# Patient Record
Sex: FEMALE | Race: WHITE | NOT HISPANIC OR LATINO | Employment: PART TIME | URBAN - METROPOLITAN AREA
[De-identification: names, ages, dates, MRNs, and addresses within clinical notes are randomized per-mention and may not be internally consistent; named-entity substitution may affect disease eponyms.]

---

## 2017-01-03 RX ORDER — HYDROCHLOROTHIAZIDE 12.5 MG/1
CAPSULE, GELATIN COATED ORAL
Qty: 90 CAP | Refills: 1 | Status: SHIPPED | OUTPATIENT
Start: 2017-01-03 | End: 2017-06-22 | Stop reason: SDUPTHER

## 2017-01-04 ENCOUNTER — ANTICOAGULATION MONITORING (OUTPATIENT)
Dept: VASCULAR LAB | Facility: MEDICAL CENTER | Age: 77
End: 2017-01-04
Attending: NURSE PRACTITIONER
Payer: COMMERCIAL

## 2017-01-04 DIAGNOSIS — I48.19 PERSISTENT ATRIAL FIBRILLATION (HCC): ICD-10-CM

## 2017-01-04 LAB
INR BLD: 4.1 (ref 0.9–1.2)
INR PPP: 4.1 (ref 2–3.5)

## 2017-01-04 PROCEDURE — 85610 PROTHROMBIN TIME: CPT

## 2017-01-04 PROCEDURE — 99212 OFFICE O/P EST SF 10 MIN: CPT

## 2017-01-04 NOTE — MR AVS SNAPSHOT
Rebekah Hernandezbins   2017 7:30 AM   Anticoagulation Monitoring   MRN: [id number]    Department:  Vascular Medicine   Dept Phone:  [phone]    Description:  Female : 1940   Provider:  [facility] EXAM 5           Allergies as of 2017     Allergen Noted Reactions    Percocet [Oxycodone-Acetaminophen] 2014         You were diagnosed with     Persistent atrial fibrillation (HCC)   [595338]         Vital Signs     Smoking Status                   Never Smoker            Basic Information     Date Of Birth Sex Race Ethnicity Preferred Language    1940 Female White Non- English      Your appointments     2017  7:30 AM   Established Patient with [facility] EXAM 4   [facility] [place] [facility]  (--)    [address]  [place] [address]   [phone]            2017  1:00 PM   PACER CHECK ONLY with PACER CHECK-CAM B   [facility] for Heart and Vascular Health-CAM B (--)    [address], [address]  [place] [address] [phone]            2017  1:30 PM   FOLLOW UP with Dayo Nelson M.D.   [facility] (--)    [address], [address]  [place] [address] [phone]              Problem List              ICD-10-CM Priority Class Noted - Resolved    Hypercholesterolemia E78.00 High  Unknown - Present    Essential hypertension, benign I10 High  Unknown - Present    Hypertrophic obstructive cardiomyopathy (HCC) I42.1 High  Unknown - Present    History of orthostatic hypotension Z86.79 High  Unknown - Present    Palpitations R00.2 High  Unknown - Present    Sinus bradycardia R00.1 High  Unknown - Present    History of digestive disorder Z87.19 High  Unknown - Present    HH (hiatus hernia) K44.9 High  Unknown - Present    Cardiac pacemaker in situ Z95.0 High  2012 - Present    Other and unspecified hyperlipidemia E78.5   2014 - Present    Persistent atrial fibrillation  ([facility]) I48.1 High  1/21/2015 - Present    Hyperlipemia (low HDL high LDL) E78.5   1/20/2016 - Present      Health Maintenance        Date Due Completion Dates    IMM DTaP/Tdap/Td Vaccine (1 - Tdap) 9/12/1961 ---    PAP SMEAR 9/12/1963 ---    MAMMOGRAM 9/12/1982 ---    COLONOSCOPY 9/12/1992 ---    IMM ZOSTER VACCINE 9/12/2002 ---    BONE DENSITY 9/12/2007 ---    IMM PNEUMOCOCCAL 65+ (ADULT) LOW/MEDIUM RISK SERIES (1 of 2 - PCV13) 9/12/2007 ---    IMM INFLUENZA (1) 9/1/2016 ---            Results     POCT Protime      Component    INR    4.1                        Current Immunizations     No immunizations on file.      Below and/or attached are the medications your provider expects you to take. Review all of your home medications and newly ordered medications with your provider and/or pharmacist. Follow medication instructions as directed by your provider and/or pharmacist. Please keep your medication list with you and share with your provider. Update the information when medications are discontinued, doses are changed, or new medications (including over-the-counter products) are added; and carry medication information at all times in the event of emergency situations     Allergies:  PERCOCET - (reactions not documented)               Medications  Valid as of: January 04, 2017 -  7:31 AM    Generic Name Brand Name Tablet Size Instructions for use    AmLODIPine Besylate (Tab) NORVASC 5 MG TAKE 1 TABLET BY MOUTH EVERY DAY        Calcium-Magnesium-Vitamin D   Take 500 mg by mouth every day.        Carvedilol (Tab) COREG 25 MG TAKE 1 TABLET BY MOUTH TWICE A DAY WITH MEALS        Cholecalciferol (Tab) cholecalciferol 1000 UNIT Take 2,000 Units by mouth every day.        DiltiaZEM HCl Coated Beads (CAPSULE SR 24 HR) CARDIZEM  MG Take 1 Cap by mouth every day.        HydroCHLOROthiazide (Cap) MICROZIDE 12.5 MG TAKE 1 CAPSULE BY MOUTH EVERY DAY        Multiple Minerals-Vitamins (Tab) CITRACAL PLUS  Take 1 Tab by mouth  every day.        Simvastatin (Tab) ZOCOR 20 MG Take 20 mg by mouth every evening.        Triamcinolone Acetonide (Cream) KENALOG 0.1 % Apply  to affected area(s) every day.        Warfarin Sodium (Tab) COUMADIN 5 MG Take 1 Tab by mouth every day.        .                 Medicines prescribed today were sent to:     [id number] - [place], [place] - [id number] [facility] [place] [address]    Phone: [phone] Fax: [phone]    Open 24 Hours?: No      Medication refill instructions:       If your prescription bottle indicates you have medication refills left, it is not necessary to call your provider’s office. Please contact your pharmacy and they will refill your medication.    If your prescription bottle indicates you do not have any refills left, you may request refills at any time through one of the following ways: The online Think Good Thoughts system (except Urgent Care), by calling your provider’s office, or by asking your pharmacy to contact your provider’s office with a refill request. Medication refills are processed only during regular business hours and may not be available until the next business day. Your provider may request additional information or to have a follow-up visit with you prior to refilling your medication.   *Please Note: Medication refills are assigned a new Rx number when refilled electronically. Your pharmacy may indicate that no refills were authorized even though a new prescription for the same medication is available at the pharmacy. Please request the medicine by name with the pharmacy before contacting your provider for a refill.        Warfarin Dosing Calendar   January 2017 Details    Sun Mon Tue Wed Thu Fri Sat     1               2               3               4   4.1   Hold   See details      5      5 mg         6      5 mg         7      5 mg           8      5 mg         9      5 mg         10      5 mg         11      5 mg         12      5 mg          13      5 mg         14      5 mg           15      5 mg         16      5 mg         17      5 mg         18      5 mg         19               20               21                 22               23               24               25               26               27               28                 29               30               31                    Date Details   01/04 This INR check   INR: 4.1       Date of next INR:  1/18/2017         How to take your warfarin dose     To take:  5 mg Take 1 of the 5 mg tablets.    Hold Do not take your warfarin dose. See the Details table to the right for additional instructions.                   Celotor Access Code: [email or web address]  Expires: 2/3/2017  7:14 AM    Celotor  A secure, online tool to manage your health information     [employer]’s Celotor® is a secure, online tool that connects you to your personalized health information from the privacy of your home -- day or night - making it very easy for you to manage your healthcare. Once the activation process is completed, you can even access your medical information using the Celotor mala, which is available for free in the Apple Mala store or Google Play store.     Celotor provides the following levels of access (as shown below):   My Chart Features   [facility] Primary [facility] Doctor [facility]  Specialists [facility]  Urgent  Care Non-[facility]  Primary Care  Doctor   Email your healthcare team securely and privately 24/7 X X X    Manage appointments: schedule your next appointment; view details of past/upcoming appointments X      Request prescription refills. X      View recent personal medical records, including lab and immunizations X X X X   View health record, including health history, allergies, medications X X X X   Read reports about your outpatient visits, procedures, consult and ER notes X X X X   See your discharge summary, which is a recap of your hospital and/or ER visit that includes your diagnosis, lab results, and  care plan. X X       How to register for IPX:  1. Go to  [email or web address].  2. Click on the Sign Up Now box, which takes you to the New Member Sign Up page. You will need to provide the following information:  a. Enter your IPX Access Code exactly as it appears at the top of this page. (You will not need to use this code after you’ve completed the sign-up process. If you do not sign up before the expiration date, you must request a new code.)   b. Enter your date of birth.   c. Enter your home email address.   d. Click Submit, and follow the next screen’s instructions.  3. Create a IPX ID. This will be your IPX login ID and cannot be changed, so think of one that is secure and easy to remember.  4. Create a Cookistot password. You can change your password at any time.  5. Enter your Password Reset Question and Answer. This can be used at a later time if you forget your password.   6. Enter your e-mail address. This allows you to receive e-mail notifications when new information is available in IPX.  7. Click Sign Up. You can now view your health information.    For assistance activating your IPX account, call [phone]

## 2017-01-04 NOTE — PROGRESS NOTES
Anticoagulation Summary as of 1/4/2017     INR goal 2.0-3.0   Selected INR 4.1! (1/4/2017)   Maintenance plan 5 mg (5 mg x 1) every day   Weekly total 35 mg   Plan last modified LEXIS Hazel (2/22/2016)   Next INR check 1/18/2017   Target end date Indefinite    Indications   Persistent atrial fibrillation (HCC) [I48.1]         Anticoagulation Episode Summary     INR check location     Preferred lab     Send INR reminders to     Comments       Anticoagulation Care Providers     Provider Role Specialty Phone number    Dayo Nelson M.D. Referring Cardiology [phone]    [facility] Anticoagulation Services Responsible  [phone]        Anticoagulation Patient Findings    Patient's INR was SUPRA therapeutic today in clinic.     Pt denies any unusual s/s of bleeding, bruising, clotting or any changes to diet or medications.  Denies alcohol or cranberry use.   Confirmed dosing regimen.  Pt to hold today then Pt is to continue with current warfarin dosing regimen.    Follow up in 2 weeks then extend interval back to 8 weeks if therapeutic next visit.    Matt Walters, PHARMD

## 2017-01-06 DIAGNOSIS — I48.19 PERSISTENT ATRIAL FIBRILLATION (HCC): ICD-10-CM

## 2017-01-18 ENCOUNTER — ANTICOAGULATION VISIT (OUTPATIENT)
Dept: VASCULAR LAB | Facility: MEDICAL CENTER | Age: 77
End: 2017-01-18
Attending: NURSE PRACTITIONER
Payer: COMMERCIAL

## 2017-01-18 DIAGNOSIS — I48.19 PERSISTENT ATRIAL FIBRILLATION (HCC): ICD-10-CM

## 2017-01-18 LAB
INR BLD: 3.5 (ref 0.9–1.2)
INR PPP: 3.5 (ref 2–3.5)

## 2017-01-18 PROCEDURE — 99212 OFFICE O/P EST SF 10 MIN: CPT

## 2017-01-18 PROCEDURE — 85610 PROTHROMBIN TIME: CPT

## 2017-01-18 NOTE — PROGRESS NOTES
OP Anticoagulation Service Note    Date: 1/18/2017    Anticoagulation Summary as of 1/18/2017     INR goal 2.0-3.0   Selected INR 3.5! (1/18/2017)   Maintenance plan 2.5 mg (5 mg x 0.5) on Wed; 5 mg (5 mg x 1) all other days   Weekly total 32.5 mg   Plan last modified Luisa Nick PHARMD (1/18/2017)   Next INR check 2/1/2017   Target end date Indefinite    Indications   Persistent atrial fibrillation (CMS-HCC) [I48.1]         Anticoagulation Episode Summary     INR check location     Preferred lab     Send INR reminders to     Comments       Anticoagulation Care Providers     Provider Role Specialty Phone number    Dayo Nelson M.D. Referring Cardiology [phone]    [facility] Anticoagulation Services Responsible  [phone]        Anticoagulation Patient Findings   Positives Medication Changes    Negatives Missed Doses, Extra Doses, Antibiotic Use, Diet Changes, Dental/Other Procedures, Hospitalization, Bleeding Gums, Nose Bleeds, Blood in Urine, Blood in Stool, Any Bruising, Other Complaints          Plan:  INR is high today. Confirmed dosing regimen. No missed or extra doses taken. Patient denies sign/symptoms of bleeding/clotting. Pt was started on amoxicillin 875 mg BID x 10 days. Patient has been eating a consistent diet. Instructed pt to call clinic with any concerns of bleeding or thrombosis. Will decrease weekly warfarin dosing regimen.  Follow up in 2 weeks      Luisa Nick, Pharm D

## 2017-01-18 NOTE — MR AVS SNAPSHOT
Rebekah Hayward   2017 7:30 AM   Anticoagulation Visit   MRN: [id number]    Department:  Vascular Medicine   Dept Phone:  [phone]    Description:  Female : 1940   Provider:  [facility] EXAM 4           Allergies as of 2017     Allergen Noted Reactions    Percocet [Oxycodone-Acetaminophen] 2014         You were diagnosed with     Persistent atrial fibrillation (CMS-HCC)   [616310]         Vital Signs     Smoking Status                   Never Smoker            Basic Information     Date Of Birth Sex Race Ethnicity Preferred Language    1940 Female White Non- English      Your appointments     2017  1:00 PM   PACER CHECK ONLY with PACER CHECK-CAM B   [facility] for [facility] and Vascular Health-CAM B (--)    [address], [address]  [place] [address] [phone]            2017  1:30 PM   FOLLOW UP with Dayo Nelson M.D.   [facility] and Vascular Health-CAM B (--)    [address], [address]  [place] [address] [phone]            2017  7:30 AM   Established Patient with [facility] EXAM 4   [facility] V[facility]  (--)    [address]  [place] [address]   [phone]              Problem List              ICD-10-CM Priority Class Noted - Resolved    Hypercholesterolemia E78.00 High  Unknown - Present    Essential hypertension, benign I10 High  Unknown - Present    Hypertrophic obstructive cardiomyopathy (CMS-HCC) I42.1 High  Unknown - Present    History of orthostatic hypotension Z86.79 High  Unknown - Present    Palpitations R00.2 High  Unknown - Present    Sinus bradycardia R00.1 High  Unknown - Present    History of digestive disorder Z87.19 High  Unknown - Present    HH (hiatus hernia) K44.9 High  Unknown - Present    Cardiac pacemaker in situ Z95.0 High  2012 - Present    Other and unspecified hyperlipidemia E78.5   2014 - Present    Persistent atrial  fibrillation (CMS-HCC) I48.1 High  1/21/2015 - Present    Hyperlipemia (low HDL high LDL) E78.5   1/20/2016 - Present      Health Maintenance        Date Due Completion Dates    IMM DTaP/Tdap/Td Vaccine (1 - Tdap) 9/12/1961 ---    PAP SMEAR 9/12/1963 ---    MAMMOGRAM 9/12/1982 ---    COLONOSCOPY 9/12/1992 ---    IMM ZOSTER VACCINE 9/12/2002 ---    BONE DENSITY 9/12/2007 ---    IMM PNEUMOCOCCAL 65+ (ADULT) LOW/MEDIUM RISK SERIES (1 of 2 - PCV13) 9/12/2007 ---    IMM INFLUENZA (1) 9/1/2016 ---            Results     POCT Protime      Component    INR    3.5                        Current Immunizations     No immunizations on file.      Below and/or attached are the medications your provider expects you to take. Review all of your home medications and newly ordered medications with your provider and/or pharmacist. Follow medication instructions as directed by your provider and/or pharmacist. Please keep your medication list with you and share with your provider. Update the information when medications are discontinued, doses are changed, or new medications (including over-the-counter products) are added; and carry medication information at all times in the event of emergency situations     Allergies:  PERCOCET - (reactions not documented)               Medications  Valid as of: January 18, 2017 -  7:37 AM    Generic Name Brand Name Tablet Size Instructions for use    AmLODIPine Besylate (Tab) NORVASC 5 MG TAKE 1 TABLET BY MOUTH EVERY DAY        Calcium-Magnesium-Vitamin D   Take 500 mg by mouth every day.        Carvedilol (Tab) COREG 25 MG TAKE 1 TABLET BY MOUTH TWICE A DAY WITH MEALS        Cholecalciferol (Tab) cholecalciferol 1000 UNIT Take 2,000 Units by mouth every day.        DiltiaZEM HCl Coated Beads (CAPSULE SR 24 HR) CARDIZEM  MG Take 1 Cap by mouth every day.        HydroCHLOROthiazide (Cap) MICROZIDE 12.5 MG TAKE 1 CAPSULE BY MOUTH EVERY DAY        Multiple Minerals-Vitamins (Tab) CITRACAL PLUS  Take  1 Tab by mouth every day.        Simvastatin (Tab) ZOCOR 20 MG Take 20 mg by mouth every evening.        Triamcinolone Acetonide (Cream) KENALOG 0.1 % Apply  to affected area(s) every day.        Warfarin Sodium (Tab) COUMADIN 5 MG Take 1 Tab by mouth every day.        .                 Medicines prescribed today were sent to:     [facility] - [place], [place] - [id number] [facility] [place] [address]    Phone: [phone] Fax: [phone]    Open 24 Hours?: No      Medication refill instructions:       If your prescription bottle indicates you have medication refills left, it is not necessary to call your provider’s office. Please contact your pharmacy and they will refill your medication.    If your prescription bottle indicates you do not have any refills left, you may request refills at any time through one of the following ways: The online First Meta system (except Urgent Care), by calling your provider’s office, or by asking your pharmacy to contact your provider’s office with a refill request. Medication refills are processed only during regular business hours and may not be available until the next business day. Your provider may request additional information or to have a follow-up visit with you prior to refilling your medication.   *Please Note: Medication refills are assigned a new Rx number when refilled electronically. Your pharmacy may indicate that no refills were authorized even though a new prescription for the same medication is available at the pharmacy. Please request the medicine by name with the pharmacy before contacting your provider for a refill.        Warfarin Dosing Calendar   January 2017 Details    Sun Mon Tue Wed Thu Fri Sat     1               2               3               4               5               6               7                 8               9               10               11               12               13               14                  15               16               17               18   3.5   2.5 mg   See details      19      5 mg         20      5 mg         21      5 mg           22      5 mg         23      5 mg         24      5 mg         25      2.5 mg         26      5 mg         27      5 mg         28      5 mg           29      5 mg         30      5 mg         31      5 mg              Date Details   01/18 This INR check   INR: 3.5               How to take your warfarin dose     To take:  2.5 mg Take 0.5 of a 5 mg tablet.    To take:  5 mg Take 1 of the 5 mg tablets.           Warfarin Dosing Calendar   February 2017 Details    Sun Mon Tue Wed Thu Fri Sat        1      2.5 mg         2               3               4                 5               6               7               8               9               10               11                 12               13               14               15               16               17               18                 19               20               21               22               23               24               25                 26               27               28                    Date Details   No additional details    Date of next INR:  2/1/2017         How to take your warfarin dose     To take:  2.5 mg Take 0.5 of a 5 mg tablet.              ESP Technologies Access Code: [email or web address]  Expires: 2/3/2017  7:14 AM    ESP Technologies  A secure, online tool to manage your health information     [employer]’s ESP Technologies® is a secure, online tool that connects you to your personalized health information from the privacy of your home -- day or night - making it very easy for you to manage your healthcare. Once the activation process is completed, you can even access your medical information using the ESP Technologies mala, which is available for free in the Apple Mala store or Google Play store.     ESP Technologies provides the following levels of access (as shown below):   My Chart Features   [facility]  Care Doctor [facility]  Specialists [facility]  Urgent  Care Non-[facility]  Primary Care  Doctor   Email your healthcare team securely and privately 24/7 X X X    Manage appointments: schedule your next appointment; view details of past/upcoming appointments X      Request prescription refills. X      View recent personal medical records, including lab and immunizations X X X X   View health record, including health history, allergies, medications X X X X   Read reports about your outpatient visits, procedures, consult and ER notes X X X X   See your discharge summary, which is a recap of your hospital and/or ER visit that includes your diagnosis, lab results, and care plan. X X       How to register for [employer]:  1. Go to  [email or web address].  2. Click on the Sign Up Now box, which takes you to the New Member Sign Up page. You will need to provide the following information:  a. Enter your NephroGenex Access Code exactly as it appears at the top of this page. (You will not need to use this code after you’ve completed the sign-up process. If you do not sign up before the expiration date, you must request a new code.)   b. Enter your date of birth.   c. Enter your home email address.   d. Click Submit, and follow the next screen’s instructions.  3. Create a NephroGenex ID. This will be your NephroGenex login ID and cannot be changed, so think of one that is secure and easy to remember.  4. Create a NephroGenex password. You can change your password at any time.  5. Enter your Password Reset Question and Answer. This can be used at a later time if you forget your password.   6. Enter your e-mail address. This allows you to receive e-mail notifications when new information is available in NephroGenex.  7. Click Sign Up. You can now view your health information.    For assistance activating your NephroGenex account, call [phone]

## 2017-01-31 ENCOUNTER — NON-PROVIDER VISIT (OUTPATIENT)
Dept: CARDIOLOGY | Facility: MEDICAL CENTER | Age: 77
End: 2017-01-31
Payer: COMMERCIAL

## 2017-01-31 ENCOUNTER — OFFICE VISIT (OUTPATIENT)
Dept: CARDIOLOGY | Facility: MEDICAL CENTER | Age: 77
End: 2017-01-31
Payer: COMMERCIAL

## 2017-01-31 VITALS
WEIGHT: 169 LBS | SYSTOLIC BLOOD PRESSURE: 124 MMHG | HEIGHT: 68 IN | HEART RATE: 72 BPM | BODY MASS INDEX: 25.61 KG/M2 | DIASTOLIC BLOOD PRESSURE: 82 MMHG

## 2017-01-31 DIAGNOSIS — Z95.0 CARDIAC PACEMAKER IN SITU: ICD-10-CM

## 2017-01-31 DIAGNOSIS — I10 ESSENTIAL HYPERTENSION, BENIGN: ICD-10-CM

## 2017-01-31 DIAGNOSIS — I48.19 PERSISTENT ATRIAL FIBRILLATION (HCC): ICD-10-CM

## 2017-01-31 PROCEDURE — 1101F PT FALLS ASSESS-DOCD LE1/YR: CPT | Mod: 8P | Performed by: INTERNAL MEDICINE

## 2017-01-31 PROCEDURE — G8484 FLU IMMUNIZE NO ADMIN: HCPCS | Performed by: INTERNAL MEDICINE

## 2017-01-31 PROCEDURE — 3014F SCREEN MAMMO DOC REV: CPT | Mod: 8P | Performed by: INTERNAL MEDICINE

## 2017-01-31 PROCEDURE — 3017F COLORECTAL CA SCREEN DOC REV: CPT | Mod: 8P | Performed by: INTERNAL MEDICINE

## 2017-01-31 PROCEDURE — G8432 DEP SCR NOT DOC, RNG: HCPCS | Performed by: INTERNAL MEDICINE

## 2017-01-31 PROCEDURE — G8420 CALC BMI NORM PARAMETERS: HCPCS | Performed by: INTERNAL MEDICINE

## 2017-01-31 PROCEDURE — 93279 PRGRMG DEV EVAL PM/LDLS PM: CPT | Performed by: INTERNAL MEDICINE

## 2017-01-31 PROCEDURE — 99214 OFFICE O/P EST MOD 30 MIN: CPT | Mod: 25 | Performed by: INTERNAL MEDICINE

## 2017-01-31 PROCEDURE — 1036F TOBACCO NON-USER: CPT | Performed by: INTERNAL MEDICINE

## 2017-01-31 PROCEDURE — 4040F PNEUMOC VAC/ADMIN/RCVD: CPT | Mod: 8P | Performed by: INTERNAL MEDICINE

## 2017-01-31 ASSESSMENT — ENCOUNTER SYMPTOMS
FALLS: 0
COUGH: 0
ABDOMINAL PAIN: 0
CLAUDICATION: 0
PND: 0
FEVER: 0
BLURRED VISION: 0
HEADACHES: 0
SORE THROAT: 0
FOCAL WEAKNESS: 0
WEAKNESS: 0
PALPITATIONS: 0
CHILLS: 0
DIZZINESS: 0
LOSS OF CONSCIOUSNESS: 0
SHORTNESS OF BREATH: 1
NAUSEA: 0
BRUISES/BLEEDS EASILY: 0
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
## 2017-01-31 NOTE — MR AVS SNAPSHOT
"Rebekah Hayward   2017 1:30 PM   Office Visit   MRN: [id number]    Department:  [facility]   Dept Phone:  [phone]    Description:  Female : 1940   Provider:  Dayo Nelson M.D.           Reason for Visit     Follow-Up           Allergies as of 2017     Allergen Noted Reactions    Percocet [Oxycodone-Acetaminophen] 2014         You were diagnosed with     Cardiac pacemaker in situ   [V45.01.ICD-9-CM]       Essential hypertension, benign   [401.1.ICD-9-CM]       Persistent atrial fibrillation (CMS-HCC)   [816291]         Vital Signs     Blood Pressure Pulse Height Weight Body Mass Index Smoking Status    124/82 mmHg 72 1.727 m (5' 7.99\") 76.658 kg (169 lb) 25.70 kg/m2 Never Smoker       Basic Information     Date Of Birth Sex Race Ethnicity Preferred Language    1940 Female White Non- English      Your appointments     2017  7:30 AM   Established Patient with [facility] EXAM 4   [facility]  (--)    [address] [address]   [phone]            2017 10:15 AM   PACER CHECK ONLY with PACER CHECK-CAM B   [facility] (--)    [address]  [place] [id number] [phone]            2017 10:45 AM   FOLLOW UP with Dayo Nelson M.D.   [facility] (--)    [address] [place] [address] [address]  [facility] [id number] [phone]              Problem List              ICD-10-CM Priority Class Noted - Resolved    Hypercholesterolemia E78.00 High  Unknown - Present    Essential hypertension, benign I10 High  Unknown - Present    Hypertrophic obstructive cardiomyopathy (CMS-HCC) I42.1 High  Unknown - Present    History of orthostatic hypotension Z86.79 High  Unknown - Present    Palpitations R00.2 High  Unknown - Present    Sinus bradycardia R00.1 High  Unknown - Present    History of digestive " disorder Z87.19 High  Unknown - Present    HH (hiatus hernia) K44.9 High  Unknown - Present    Cardiac pacemaker in situ Z95.0 High  5/8/2012 - Present    Other and unspecified hyperlipidemia E78.5   9/30/2014 - Present    Persistent atrial fibrillation (CMS-HCC) I48.1 High  1/21/2015 - Present    Dyslipidemia E78.5   1/20/2016 - Present      Health Maintenance        Date Due Completion Dates    IMM DTaP/Tdap/Td Vaccine (1 - Tdap) 9/12/1961 ---    PAP SMEAR 9/12/1963 ---    MAMMOGRAM 9/12/1982 ---    COLONOSCOPY 9/12/1992 ---    IMM ZOSTER VACCINE 9/12/2002 ---    BONE DENSITY 9/12/2007 ---    IMM PNEUMOCOCCAL 65+ (ADULT) LOW/MEDIUM RISK SERIES (1 of 2 - PCV13) 9/12/2007 ---    IMM INFLUENZA (1) 9/1/2016 ---            Current Immunizations     No immunizations on file.      Below and/or attached are the medications your provider expects you to take. Review all of your home medications and newly ordered medications with your provider and/or pharmacist. Follow medication instructions as directed by your provider and/or pharmacist. Please keep your medication list with you and share with your provider. Update the information when medications are discontinued, doses are changed, or new medications (including over-the-counter products) are added; and carry medication information at all times in the event of emergency situations     Allergies:  PERCOCET - (reactions not documented)               Medications  Valid as of: January 31, 2017 -  2:31 PM    Generic Name Brand Name Tablet Size Instructions for use    Calcium-Magnesium-Vitamin D   Take 500 mg by mouth every day.        Carvedilol (Tab) COREG 25 MG TAKE 1 TABLET BY MOUTH TWICE A DAY WITH MEALS        Cholecalciferol (Tab) cholecalciferol 1000 UNIT Take 2,000 Units by mouth every day.        DiltiaZEM HCl Coated Beads (CAPSULE SR 24 HR) CARDIZEM  MG Take 1 Cap by mouth every day.        HydroCHLOROthiazide (Cap) MICROZIDE 12.5 MG TAKE 1 CAPSULE BY MOUTH  EVERY DAY        Multiple Minerals-Vitamins (Tab) CITRACAL PLUS  Take 1 Tab by mouth every day.        Simvastatin (Tab) ZOCOR 20 MG Take 20 mg by mouth every evening.        Triamcinolone Acetonide (Cream) KENALOG 0.1 % Apply  to affected area(s) every day.        Warfarin Sodium (Tab) COUMADIN 5 MG Take 1 Tab by mouth every day.        .                 Medicines prescribed today were sent to:     [facility] [address] [place] [address], [place] - [address] [place] [address]    Phone: [phone] Fax: [phone]    Open 24 Hours?: No      Medication refill instructions:       If your prescription bottle indicates you have medication refills left, it is not necessary to call your provider’s office. Please contact your pharmacy and they will refill your medication.    If your prescription bottle indicates you do not have any refills left, you may request refills at any time through one of the following ways: The online The Social Radio system (except Urgent Care), by calling your provider’s office, or by asking your pharmacy to contact your provider’s office with a refill request. Medication refills are processed only during regular business hours and may not be available until the next business day. Your provider may request additional information or to have a follow-up visit with you prior to refilling your medication.   *Please Note: Medication refills are assigned a new Rx number when refilled electronically. Your pharmacy may indicate that no refills were authorized even though a new prescription for the same medication is available at the pharmacy. Please request the medicine by name with the pharmacy before contacting your provider for a refill.           The Social Radio Access Code: [email or web address]  Expires: 2/3/2017  7:14 AM    The Social Radio  A secure, online tool to manage your health information     [employer]’s The Social Radio® is a secure, online tool that connects you to your personalized health  information from the privacy of your home -- day or night - making it very easy for you to manage your healthcare. Once the activation process is completed, you can even access your medical information using the redIT mala, which is available for free in the Apple Mala store or Google Play store.     redIT provides the following levels of access (as shown below):   My Chart Features   [facility] Primary Care Doctor [facility]  Specialists [facility]  Urgent  Care Non-[facility]  Primary Care  Doctor   Email your healthcare team securely and privately 24/7 X X X    Manage appointments: schedule your next appointment; view details of past/upcoming appointments X      Request prescription refills. X      View recent personal medical records, including lab and immunizations X X X X   View health record, including health history, allergies, medications X X X X   Read reports about your outpatient visits, procedures, consult and ER notes X X X X   See your discharge summary, which is a recap of your hospital and/or ER visit that includes your diagnosis, lab results, and care plan. X X       How to register for redIT:  1. Go to  [email or web address].  2. Click on the Sign Up Now box, which takes you to the New Member Sign Up page. You will need to provide the following information:  a. Enter your redIT Access Code exactly as it appears at the top of this page. (You will not need to use this code after you’ve completed the sign-up process. If you do not sign up before the expiration date, you must request a new code.)   b. Enter your date of birth.   c. Enter your home email address.   d. Click Submit, and follow the next screen’s instructions.  3. Create a redIT ID. This will be your redIT login ID and cannot be changed, so think of one that is secure and easy to remember.  4. Create a redIT password. You can change your password at any time.  5. Enter your Password Reset Question and Answer. This can be used at a later  time if you forget your password.   6. Enter your e-mail address. This allows you to receive e-mail notifications when new information is available in [email or web address].  7. Click Sign Up. You can now view your health information.    For assistance activating your [email or web address] account, call [phone]

## 2017-02-01 ENCOUNTER — ANTICOAGULATION VISIT (OUTPATIENT)
Dept: VASCULAR LAB | Facility: MEDICAL CENTER | Age: 77
End: 2017-02-01
Attending: NURSE PRACTITIONER
Payer: COMMERCIAL

## 2017-02-01 DIAGNOSIS — I48.19 PERSISTENT ATRIAL FIBRILLATION (HCC): ICD-10-CM

## 2017-02-01 LAB
INR BLD: 4.1 (ref 0.9–1.2)
INR PPP: 4.1 (ref 2–3.5)

## 2017-02-01 PROCEDURE — 99212 OFFICE O/P EST SF 10 MIN: CPT

## 2017-02-01 PROCEDURE — 85610 PROTHROMBIN TIME: CPT

## 2017-02-01 NOTE — MR AVS SNAPSHOT
Rebekah Mcdonald Yesy   2017 7:30 AM   Anticoagulation Visit   MRN: [id number]    Department:  Vascular Medicine   Dept Phone:  [phone]    Description:  Female : 1940   Provider:  [facility] EXAM 4           Allergies as of 2017     Allergen Noted Reactions    Percocet [Oxycodone-Acetaminophen] 2014         You were diagnosed with     Persistent atrial fibrillation (CMS-HCC)   [290672]         Vital Signs     Smoking Status                   Never Smoker            Basic Information     Date Of Birth Sex Race Ethnicity Preferred Language    1940 Female White Non- English      Your appointments     Feb 15, 2017 10:00 AM   Established Patient with [facility] EXAM 4   [facility]  (--)    [address]  [place] [address]   [phone]            2017 10:15 AM   PACER CHECK ONLY with PACER CHECK-CAM B   [facility] for [facility] and Vascular Health-[facility] B (--)    [address]  [place] [address]   [phone]            2017 10:45 AM   FOLLOW UP with Dayo Nelson M.D.   [facility] (--)    [address]  [place] [address]   [phone]              Problem List              ICD-10-CM Priority Class Noted - Resolved    Hypercholesterolemia E78.00 High  Unknown - Present    Essential hypertension, benign I10 High  Unknown - Present    Hypertrophic obstructive cardiomyopathy (CMS-HCC) I42.1 High  Unknown - Present    History of orthostatic hypotension Z86.79 High  Unknown - Present    Palpitations R00.2 High  Unknown - Present    Sinus bradycardia R00.1 High  Unknown - Present    History of digestive disorder Z87.19 High  Unknown - Present    HH (hiatus hernia) K44.9 High  Unknown - Present    Cardiac pacemaker in situ Z95.0 High  2012 - Present    Other and unspecified hyperlipidemia E78.5   2014 - Present    Persistent atrial  fibrillation (CMS-HCC) I48.1 High  1/21/2015 - Present    Dyslipidemia E78.5   1/20/2016 - Present      Health Maintenance        Date Due Completion Dates    IMM DTaP/Tdap/Td Vaccine (1 - Tdap) 9/12/1961 ---    PAP SMEAR 9/12/1963 ---    MAMMOGRAM 9/12/1982 ---    COLONOSCOPY 9/12/1992 ---    IMM ZOSTER VACCINE 9/12/2002 ---    BONE DENSITY 9/12/2007 ---    IMM PNEUMOCOCCAL 65+ (ADULT) LOW/MEDIUM RISK SERIES (1 of 2 - PCV13) 9/12/2007 ---    IMM INFLUENZA (1) 9/1/2016 ---            Results     POCT Protime      Component    INR    4.1                        Current Immunizations     No immunizations on file.      Below and/or attached are the medications your provider expects you to take. Review all of your home medications and newly ordered medications with your provider and/or pharmacist. Follow medication instructions as directed by your provider and/or pharmacist. Please keep your medication list with you and share with your provider. Update the information when medications are discontinued, doses are changed, or new medications (including over-the-counter products) are added; and carry medication information at all times in the event of emergency situations     Allergies:  PERCOCET - (reactions not documented)               Medications  Valid as of: February 01, 2017 -  7:38 AM    Generic Name Brand Name Tablet Size Instructions for use    Calcium-Magnesium-Vitamin D   Take 500 mg by mouth every day.        Carvedilol (Tab) COREG 25 MG TAKE 1 TABLET BY MOUTH TWICE A DAY WITH MEALS        Cholecalciferol (Tab) cholecalciferol 1000 UNIT Take 2,000 Units by mouth every day.        DiltiaZEM HCl Coated Beads (CAPSULE SR 24 HR) CARDIZEM  MG Take 1 Cap by mouth every day.        HydroCHLOROthiazide (Cap) MICROZIDE 12.5 MG TAKE 1 CAPSULE BY MOUTH EVERY DAY        Multiple Minerals-Vitamins (Tab) CITRACAL PLUS  Take 1 Tab by mouth every day.        Simvastatin (Tab) ZOCOR 20 MG Take 20 mg by mouth every  evening.        Triamcinolone Acetonide (Cream) KENALOG 0.1 % Apply  to affected area(s) every day.        Warfarin Sodium (Tab) COUMADIN 5 MG Take 1 Tab by mouth every day.        .                 Medicines prescribed today were sent to:     [facility] [address] [id number] - [place], [place] - [id number] [facility] [place] [address]    Phone: [phone] Fax: [phone]    Open 24 Hours?: No      Medication refill instructions:       If your prescription bottle indicates you have medication refills left, it is not necessary to call your provider’s office. Please contact your pharmacy and they will refill your medication.    If your prescription bottle indicates you do not have any refills left, you may request refills at any time through one of the following ways: The online PerfectSearch system (except Urgent Care), by calling your provider’s office, or by asking your pharmacy to contact your provider’s office with a refill request. Medication refills are processed only during regular business hours and may not be available until the next business day. Your provider may request additional information or to have a follow-up visit with you prior to refilling your medication.   *Please Note: Medication refills are assigned a new Rx number when refilled electronically. Your pharmacy may indicate that no refills were authorized even though a new prescription for the same medication is available at the pharmacy. Please request the medicine by name with the pharmacy before contacting your provider for a refill.        Warfarin Dosing Calendar   February 2017 Details    Sun Mon Tue Wed Thu Fri Sat        1   4.1   2.5 mg   See details      2      5 mg         3      2.5 mg         4      5 mg           5      5 mg         6      2.5 mg         7      5 mg         8      5 mg         9      5 mg         10      2.5 mg         11      5 mg           12      5 mg         13      2.5 mg         14      5 mg         15       5 mg         16               17               18                 19               20               21               22               23               24               25                 26               27               28                    Date Details   02/01 This INR check   INR: 4.1       Date of next INR:  2/15/2017         How to take your warfarin dose     To take:  2.5 mg Take 0.5 of a 5 mg tablet.    To take:  5 mg Take 1 of the 5 mg tablets.              FanMob Access Code: [email or web address]  Expires: 2/3/2017  7:14 AM    FanMob  A secure, online tool to manage your health information     [employer]’s FanMob® is a secure, online tool that connects you to your personalized health information from the privacy of your home -- day or night - making it very easy for you to manage your healthcare. Once the activation process is completed, you can even access your medical information using the FanMob mala, which is available for free in the Apple Mala store or Google Play store.     FanMob provides the following levels of access (as shown below):   My Chart Features   [facility] Primary Care Doctor [facility]  Specialists [facility]  Urgent  Care [facility]  Primary Care  Doctor   Email your healthcare team securely and privately 24/7 X X X    Manage appointments: schedule your next appointment; view details of past/upcoming appointments X      Request prescription refills. X      View recent personal medical records, including lab and immunizations X X X X   View health record, including health history, allergies, medications X X X X   Read reports about your outpatient visits, procedures, consult and ER notes X X X X   See your discharge summary, which is a recap of your hospital and/or ER visit that includes your diagnosis, lab results, and care plan. X X       How to register for FanMob:  1. Go to  [email or web address].  2. Click on the Sign Up Now box, which takes you to the New Member Sign Up page. You  will need to provide the following information:  a. Enter your Proxama Access Code exactly as it appears at the top of this page. (You will not need to use this code after you’ve completed the sign-up process. If you do not sign up before the expiration date, you must request a new code.)   b. Enter your date of birth.   c. Enter your home email address.   d. Click Submit, and follow the next screen’s instructions.  3. Create a ID.met ID. This will be your Proxama login ID and cannot be changed, so think of one that is secure and easy to remember.  4. Create a Proxama password. You can change your password at any time.  5. Enter your Password Reset Question and Answer. This can be used at a later time if you forget your password.   6. Enter your e-mail address. This allows you to receive e-mail notifications when new information is available in Proxama.  7. Click Sign Up. You can now view your health information.    For assistance activating your Proxama account, call [phone]
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
## 2017-02-01 NOTE — PROGRESS NOTES
Anticoagulation Summary as of 2/1/2017     INR goal 2.0-3.0   Selected INR 4.1! (2/1/2017)   Maintenance plan 2.5 mg (5 mg x 0.5) on Mon, Fri; 5 mg (5 mg x 1) all other days   Weekly total 30 mg   Plan last modified Matt Walters, PHARMD (2/1/2017)   Next INR check 2/15/2017   Target end date Indefinite    Indications   Persistent atrial fibrillation (CMS-HCC) [I48.1]         Anticoagulation Episode Summary     INR check location     Preferred lab     Send INR reminders to     Comments       Anticoagulation Care Providers     Provider Role Specialty Phone number    Dayo Nelson M.D. Referring Cardiology [phone]    [facility] Responsible  [phone]        Anticoagulation Patient Findings    Patient's INR was STAN therapeutic today in clinic.     Denies alcohol or cranberry use.  Pt denies any unusual s/s of bleeding, bruising, clotting or any changes to diet or medications.  Confirmed dosing regimen.  Begin 7% reduced regimen.  Earliest pt can return is in 2 weeks.    Matt Walters, PHARMD

## 2017-02-14 DIAGNOSIS — I10 ESSENTIAL HYPERTENSION: ICD-10-CM

## 2017-02-14 DIAGNOSIS — I48.19 PERSISTENT ATRIAL FIBRILLATION (HCC): ICD-10-CM

## 2017-02-14 DIAGNOSIS — R00.2 PALPITATIONS: ICD-10-CM

## 2017-02-14 RX ORDER — CARVEDILOL 25 MG/1
25 TABLET ORAL 2 TIMES DAILY WITH MEALS
Qty: 180 TAB | Refills: 3 | Status: SHIPPED | OUTPATIENT
Start: 2017-02-14

## 2017-02-15 ENCOUNTER — ANTICOAGULATION VISIT (OUTPATIENT)
Dept: VASCULAR LAB | Facility: MEDICAL CENTER | Age: 77
End: 2017-02-15
Attending: INTERNAL MEDICINE
Payer: COMMERCIAL

## 2017-02-15 DIAGNOSIS — I48.19 PERSISTENT ATRIAL FIBRILLATION (HCC): ICD-10-CM

## 2017-02-15 LAB — INR PPP: 2.6 (ref 2–3.5)

## 2017-02-15 PROCEDURE — 99211 OFF/OP EST MAY X REQ PHY/QHP: CPT | Performed by: NURSE PRACTITIONER

## 2017-02-15 PROCEDURE — 85610 PROTHROMBIN TIME: CPT

## 2017-02-15 RX ORDER — WARFARIN SODIUM 5 MG/1
TABLET ORAL
Qty: 90 TAB | Refills: 1 | Status: SHIPPED | OUTPATIENT
Start: 2017-02-15 | End: 2017-02-17 | Stop reason: SDUPTHER

## 2017-02-15 NOTE — PROGRESS NOTES
Anticoagulation Summary as of 2/15/2017     INR goal 2.0-3.0   Selected INR 2.6 (2/15/2017)   Maintenance plan 2.5 mg (5 mg x 0.5) on Mon, Fri; 5 mg (5 mg x 1) all other days   Weekly total 30 mg   Plan last modified Matt Walters, PHARMD (2/1/2017)   Next INR check 3/8/2017   Target end date Indefinite    Indications   Persistent atrial fibrillation (CMS-HCC) [I48.1]         Anticoagulation Episode Summary     INR check location     Preferred lab     Send INR reminders to     Comments       Anticoagulation Care Providers     Provider Role Specialty Phone number    Dayo Nelson M.D. Referring Cardiology [phone]    [facility] Responsible  [phone]        Patient is therapeutic today. Denies any medication or diet changes. No current symptoms of bleeding or thrombosis reported. Continue current regimen. Follow up in 3 weeks.    Next Appointment: Wednesday, March 8, 2017 at 7:30 am.     Tiffany ZAZUETA

## 2017-02-15 NOTE — MR AVS SNAPSHOT
Rebekah Mcdonald Yesy   2/15/2017 10:00 AM   Anticoagulation Visit   MRN: [id number]    Department:  Vascular Medicine   Dept Phone:  [phone]    Description:  Female : 1940   Provider:  [facility]           Allergies as of 2/15/2017     Allergen Noted Reactions    Percocet [Oxycodone-Acetaminophen] 2014         You were diagnosed with     Persistent atrial fibrillation (CMS-HCC)   [815071]         Vital Signs     Smoking Status                   Never Smoker            Basic Information     Date Of Birth Sex Race Ethnicity Preferred Language    1940 Female White Non- English      Your appointments     Mar 08, 2017  8:00 AM   Established Patient with [facility]   [facility]  (--)    [address]  [place] [address]   [phone]            2017 10:15 AM   PACER CHECK ONLY with PACER CHECK-[facility]   [facility] (--)    [address]  [place] [address]   [phone]            2017 10:45 AM   FOLLOW UP with Dayo Nelson M.D.   [facility] (--)    [address]  [place] [address]   [phone]              Problem List              ICD-10-CM Priority Class Noted - Resolved    Hypercholesterolemia E78.00 High  Unknown - Present    Essential hypertension, benign I10 High  Unknown - Present    Hypertrophic obstructive cardiomyopathy (CMS-HCC) I42.1 High  Unknown - Present    History of orthostatic hypotension Z86.79 High  Unknown - Present    Palpitations R00.2 High  Unknown - Present    Sinus bradycardia R00.1 High  Unknown - Present    History of digestive disorder Z87.19 High  Unknown - Present    HH (hiatus hernia) K44.9 High  Unknown - Present    Cardiac pacemaker in situ Z95.0 High  2012 - Present    Other and unspecified hyperlipidemia E78.5   2014 - Present    Persistent atrial  fibrillation (CMS-HCC) I48.1 High  1/21/2015 - Present    Dyslipidemia E78.5   1/20/2016 - Present      Health Maintenance        Date Due Completion Dates    IMM DTaP/Tdap/Td Vaccine (1 - Tdap) 9/12/1961 ---    PAP SMEAR 9/12/1963 ---    MAMMOGRAM 9/12/1982 ---    COLONOSCOPY 9/12/1992 ---    IMM ZOSTER VACCINE 9/12/2002 ---    BONE DENSITY 9/12/2007 ---    IMM PNEUMOCOCCAL 65+ (ADULT) LOW/MEDIUM RISK SERIES (1 of 2 - PCV13) 9/12/2007 ---    IMM INFLUENZA (1) 9/1/2016 ---            Results     POCT Protime      Component    INR    2.6                        Current Immunizations     No immunizations on file.      Below and/or attached are the medications your provider expects you to take. Review all of your home medications and newly ordered medications with your provider and/or pharmacist. Follow medication instructions as directed by your provider and/or pharmacist. Please keep your medication list with you and share with your provider. Update the information when medications are discontinued, doses are changed, or new medications (including over-the-counter products) are added; and carry medication information at all times in the event of emergency situations     Allergies:  PERCOCET - (reactions not documented)               Medications  Valid as of: February 15, 2017 - 10:06 AM    Generic Name Brand Name Tablet Size Instructions for use    Calcium-Magnesium-Vitamin D   Take 500 mg by mouth every day.        Carvedilol (Tab) COREG 25 MG Take 1 Tab by mouth 2 times a day, with meals.        Cholecalciferol (Tab) cholecalciferol 1000 UNIT Take 2,000 Units by mouth every day.        DiltiaZEM HCl Coated Beads (CAPSULE SR 24 HR) CARDIZEM  MG Take 1 Cap by mouth every day.        HydroCHLOROthiazide (Cap) MICROZIDE 12.5 MG TAKE 1 CAPSULE BY MOUTH EVERY DAY        Multiple Minerals-Vitamins (Tab) CITRACAL PLUS  Take 1 Tab by mouth every day.        Simvastatin (Tab) ZOCOR 20 MG Take 20 mg by mouth every  evening.        Triamcinolone Acetonide (Cream) KENALOG 0.1 % Apply  to affected area(s) every day.        Warfarin Sodium (Tab) COUMADIN 5 MG Take 1 Tab by mouth every day.        .                 Medicines prescribed today were sent to:     [id number] - [place], [place] - [id number] [facility] [place] [address]    Phone: [phone] Fax: [phone]    Open 24 Hours?: No      Medication refill instructions:       If your prescription bottle indicates you have medication refills left, it is not necessary to call your provider’s office. Please contact your pharmacy and they will refill your medication.    If your prescription bottle indicates you do not have any refills left, you may request refills at any time through one of the following ways: The online [employer] system (except Urgent Care), by calling your provider’s office, or by asking your pharmacy to contact your provider’s office with a refill request. Medication refills are processed only during regular business hours and may not be available until the next business day. Your provider may request additional information or to have a follow-up visit with you prior to refilling your medication.   *Please Note: Medication refills are assigned a new Rx number when refilled electronically. Your pharmacy may indicate that no refills were authorized even though a new prescription for the same medication is available at the pharmacy. Please request the medicine by name with the pharmacy before contacting your provider for a refill.        Warfarin Dosing Calendar   February 2017 Details    Sun Mon Tue Wed Thu Fri Sat        1               2               3               4                 5               6               7               8               9               10               11                 12               13               14               15   2.6   5 mg   See details      16      5 mg         17      2.5 mg         18        5 mg           19      5 mg         20      2.5 mg         21      5 mg         22      5 mg         23      5 mg         24      2.5 mg         25      5 mg           26      5 mg         27      2.5 mg         28      5 mg              Date Details   02/15 This INR check   INR: 2.6               How to take your warfarin dose     To take:  2.5 mg Take 0.5 of a 5 mg tablet.    To take:  5 mg Take 1 of the 5 mg tablets.           Warfarin Dosing Calendar   March 2017 Details    Sun Mon Tue Wed Thu Fri Sat        1      5 mg         2      5 mg         3      2.5 mg         4      5 mg           5      5 mg         6      2.5 mg         7      5 mg         8      5 mg         9               10               11                 12               13               14               15               16               17               18                 19               20               21               22               23               24               25                 26               27               28               29               30               31                 Date Details   No additional details    Date of next INR:  3/8/2017         How to take your warfarin dose     To take:  2.5 mg Take 0.5 of a 5 mg tablet.    To take:  5 mg Take 1 of the 5 mg tablets.              [employer] Access Code: [email or web address]  Expires: 3/17/2017 10:06 AM    [employer]  A secure, online tool to manage your health information     [employer]’s [employer]® is a secure, online tool that connects you to your personalized health information from the privacy of your home -- day or night - making it very easy for you to manage your healthcare. Once the activation process is completed, you can even access your medical information using the [employer] mala, which is available for free in the Apple Mala store or Google Play store.     [employer] provides the following levels of access (as shown below):   My Chart Features   [facility] Care  Doctor [facility]  Specialists [facility]  Urgent  Care [facility]  Primary Care  Doctor   Email your healthcare team securely and privately 24/7 X X X    Manage appointments: schedule your next appointment; view details of past/upcoming appointments X      Request prescription refills. X      View recent personal medical records, including lab and immunizations X X X X   View health record, including health history, allergies, medications X X X X   Read reports about your outpatient visits, procedures, consult and ER notes X X X X   See your discharge summary, which is a recap of your hospital and/or ER visit that includes your diagnosis, lab results, and care plan. X X       How to register for [employer]:  1. Go to  [email or web address].  2. Click on the Sign Up Now box, which takes you to the New Member Sign Up page. You will need to provide the following information:  a. Enter your [employer] Access Code exactly as it appears at the top of this page. (You will not need to use this code after you’ve completed the sign-up process. If you do not sign up before the expiration date, you must request a new code.)   b. Enter your date of birth.   c. Enter your home email address.   d. Click Submit, and follow the next screen’s instructions.  3. Create a [employer] ID. This will be your [employer] login ID and cannot be changed, so think of one that is secure and easy to remember.  4. Create a [employer] password. You can change your password at any time.  5. Enter your Password Reset Question and Answer. This can be used at a later time if you forget your password.   6. Enter your e-mail address. This allows you to receive e-mail notifications when new information is available in [employer].  7. Click Sign Up. You can now view your health information.    For assistance activating your [employer] account, call [phone]

## 2017-02-17 DIAGNOSIS — I48.19 PERSISTENT ATRIAL FIBRILLATION (HCC): ICD-10-CM

## 2017-02-17 LAB — INR BLD: 2.6 (ref 0.9–1.2)

## 2017-02-17 RX ORDER — WARFARIN SODIUM 5 MG/1
TABLET ORAL
Qty: 90 TAB | Refills: 1 | Status: SHIPPED | OUTPATIENT
Start: 2017-02-17 | End: 2017-09-18 | Stop reason: SDUPTHER

## 2017-03-08 ENCOUNTER — ANTICOAGULATION VISIT (OUTPATIENT)
Dept: VASCULAR LAB | Facility: MEDICAL CENTER | Age: 77
End: 2017-03-08
Attending: INTERNAL MEDICINE
Payer: COMMERCIAL

## 2017-03-08 VITALS — SYSTOLIC BLOOD PRESSURE: 160 MMHG | HEART RATE: 70 BPM | DIASTOLIC BLOOD PRESSURE: 99 MMHG

## 2017-03-08 DIAGNOSIS — I48.19 PERSISTENT ATRIAL FIBRILLATION (HCC): ICD-10-CM

## 2017-03-08 LAB
INR BLD: 2.5 (ref 0.9–1.2)
INR PPP: 2.5 (ref 2–3.5)

## 2017-03-08 PROCEDURE — 85610 PROTHROMBIN TIME: CPT

## 2017-03-08 PROCEDURE — 99211 OFF/OP EST MAY X REQ PHY/QHP: CPT

## 2017-03-08 NOTE — PROGRESS NOTES
Anticoagulation Summary as of 3/8/2017     INR goal 2.0-3.0   Selected INR 2.5 (3/8/2017)   Maintenance plan 2.5 mg (5 mg x 0.5) on Mon, Fri; 5 mg (5 mg x 1) all other days   Weekly total 30 mg   Plan last modified Matt Walters, PHARMD (2/1/2017)   Next INR check 4/5/2017   Target end date Indefinite    Indications   Persistent atrial fibrillation (CMS-HCC) [I48.1]         Anticoagulation Episode Summary     INR check location     Preferred lab     Send INR reminders to     Comments       Anticoagulation Care Providers     Provider Role Specialty Phone number    Dayo Nelson M.D. Referring Cardiology [phone]    [facility] Responsible  [phone]        Anticoagulation Patient Findings    Patient's INR was therapeutic today in clinic.    Pt denies any unusual s/s of bleeding, bruising, clotting or any changes to diet or medications.  Confirmed dosing regimen.  Pt is to continue with current warfarin dosing regimen.  BP is elevated, continue to monitor.     Follow  Up in 4 weeks.    Matt Walters, PHARMD

## 2017-03-08 NOTE — MR AVS SNAPSHOT
Rebekah Hernandezbins   3/8/2017 7:30 AM   Anticoagulation Visit   MRN: [id number]    Department:  Vascular Medicine   Dept Phone:  [phone]    Description:  Female : 1940   Provider:  [facility] EXAM 4           Allergies as of 3/8/2017     Allergen Noted Reactions    Percocet [Oxycodone-Acetaminophen] 2014         You were diagnosed with     Persistent atrial fibrillation (CMS-HCC)   [526248]         Vital Signs     Smoking Status                   Never Smoker            Basic Information     Date Of Birth Sex Race Ethnicity Preferred Language    1940 Female White Non- English      Your appointments     2017  7:45 AM   Established Patient with [facility] EXAM 4   [facility]  (--)    [address]  [place] [address]   [phone]            2017 10:15 AM   PACER CHECK ONLY with PACER CHECK-CAM B   [facility] (--)    [address], [address]  [place] [address]   [phone]            2017 10:45 AM   FOLLOW UP with Dayo Nelson M.D.   [facility] (--)    [address], [address]  [place] [address]   [phone]              Problem List              ICD-10-CM Priority Class Noted - Resolved    Hypercholesterolemia E78.00 High  Unknown - Present    Essential hypertension, benign I10 High  Unknown - Present    Hypertrophic obstructive cardiomyopathy (CMS-HCC) I42.1 High  Unknown - Present    History of orthostatic hypotension Z86.79 High  Unknown - Present    Palpitations R00.2 High  Unknown - Present    Sinus bradycardia R00.1 High  Unknown - Present    History of digestive disorder Z87.19 High  Unknown - Present    HH (hiatus hernia) K44.9 High  Unknown - Present    Cardiac pacemaker in situ Z95.0 High  2012 - Present    Other and unspecified hyperlipidemia E78.5   2014 - Present    Persistent atrial  fibrillation (CMS-HCC) I48.1 High  1/21/2015 - Present    Dyslipidemia E78.5   1/20/2016 - Present      Health Maintenance        Date Due Completion Dates    IMM DTaP/Tdap/Td Vaccine (1 - Tdap) 9/12/1961 ---    PAP SMEAR 9/12/1963 ---    MAMMOGRAM 9/12/1982 ---    COLONOSCOPY 9/12/1992 ---    IMM ZOSTER VACCINE 9/12/2002 ---    BONE DENSITY 9/12/2007 ---    IMM PNEUMOCOCCAL 65+ (ADULT) LOW/MEDIUM RISK SERIES (1 of 2 - PCV13) 9/12/2007 ---    IMM INFLUENZA (1) 9/1/2016 ---            Results     POCT Protime      Component    INR    2.5                        Current Immunizations     No immunizations on file.      Below and/or attached are the medications your provider expects you to take. Review all of your home medications and newly ordered medications with your provider and/or pharmacist. Follow medication instructions as directed by your provider and/or pharmacist. Please keep your medication list with you and share with your provider. Update the information when medications are discontinued, doses are changed, or new medications (including over-the-counter products) are added; and carry medication information at all times in the event of emergency situations     Allergies:  PERCOCET - (reactions not documented)               Medications  Valid as of: March 08, 2017 -  7:31 AM    Generic Name Brand Name Tablet Size Instructions for use    Calcium-Magnesium-Vitamin D   Take 500 mg by mouth every day.        Carvedilol (Tab) COREG 25 MG Take 1 Tab by mouth 2 times a day, with meals.        Cholecalciferol (Tab) cholecalciferol 1000 UNIT Take 2,000 Units by mouth every day.        DiltiaZEM HCl Coated Beads (CAPSULE SR 24 HR) CARDIZEM  MG Take 1 Cap by mouth every day.        HydroCHLOROthiazide (Cap) MICROZIDE 12.5 MG TAKE 1 CAPSULE BY MOUTH EVERY DAY        Multiple Minerals-Vitamins (Tab) CITRACAL PLUS  Take 1 Tab by mouth every day.        Simvastatin (Tab) ZOCOR 20 MG Take 20 mg by mouth every evening.          Triamcinolone Acetonide (Cream) KENALOG 0.1 % Apply  to affected area(s) every day.        Warfarin Sodium (Tab) COUMADIN 5 MG Take 1 Tab by mouth every day.        Warfarin Sodium (Tab) COUMADIN 5 MG Take one-half to one tablet by mouth one time daily as directed by coumadin clinic        .                 Medicines prescribed today were sent to:     [id number] - [place], [place] - [id number] [facility] [place] [address]    Phone: [phone] Fax: [phone]    Open 24 Hours?: No      Medication refill instructions:       If your prescription bottle indicates you have medication refills left, it is not necessary to call your provider’s office. Please contact your pharmacy and they will refill your medication.    If your prescription bottle indicates you do not have any refills left, you may request refills at any time through one of the following ways: The online Care2Manage system (except Urgent Care), by calling your provider’s office, or by asking your pharmacy to contact your provider’s office with a refill request. Medication refills are processed only during regular business hours and may not be available until the next business day. Your provider may request additional information or to have a follow-up visit with you prior to refilling your medication.   *Please Note: Medication refills are assigned a new Rx number when refilled electronically. Your pharmacy may indicate that no refills were authorized even though a new prescription for the same medication is available at the pharmacy. Please request the medicine by name with the pharmacy before contacting your provider for a refill.        Warfarin Dosing Calendar   March 2017 Details    Sun Mon Tue Wed Thu Fri Sat        1               2               3               4                 5               6               7               8   2.5   5 mg   See details      9      5 mg         10      2.5 mg         11      5 mg            12      5 mg         13      2.5 mg         14      5 mg         15      5 mg         16      5 mg         17      2.5 mg         18      5 mg           19      5 mg         20      2.5 mg         21      5 mg         22      5 mg         23      5 mg         24      2.5 mg         25      5 mg           26      5 mg         27      2.5 mg         28      5 mg         29      5 mg         30      5 mg         31      2.5 mg           Date Details   03/08 This INR check   INR: 2.5               How to take your warfarin dose     To take:  2.5 mg Take 0.5 of a 5 mg tablet.    To take:  5 mg Take 1 of the 5 mg tablets.           Warfarin Dosing Calendar   April 2017 Details    Sun Mon Tue Wed Thu Fri Sat           1      5 mg           2      5 mg         3      2.5 mg         4      5 mg         5      5 mg         6               7               8                 9               10               11               12               13               14               15                 16               17               18               19               20               21               22                 23               24               25               26               27               28               29                 30                      Date Details   No additional details    Date of next INR:  4/5/2017         How to take your warfarin dose     To take:  2.5 mg Take 0.5 of a 5 mg tablet.    To take:  5 mg Take 1 of the 5 mg tablets.              Y-Clients Access Code: [email or web address]  Expires: 3/17/2017 10:06 AM    Y-Clients  A secure, online tool to manage your health information     [employer] Y-Clients® is a secure, online tool that connects you to your personalized health information from the privacy of your home -- day or night - making it very easy for you to manage your healthcare. Once the activation process is completed, you can even access your medical information using the Y-Clients tesha,  which is available for free in the Apple Mala store or Google Play store.     FunGoPlay provides the following levels of access (as shown below):   My Chart Features   [facility] Doctor [facility]  Specialists [facility]  Urgent  Care Non-[facility]  Primary Care  Doctor   Email your healthcare team securely and privately 24/7 X X X    Manage appointments: schedule your next appointment; view details of past/upcoming appointments X      Request prescription refills. X      View recent personal medical records, including lab and immunizations X X X X   View health record, including health history, allergies, medications X X X X   Read reports about your outpatient visits, procedures, consult and ER notes X X X X   See your discharge summary, which is a recap of your hospital and/or ER visit that includes your diagnosis, lab results, and care plan. X X       How to register for FunGoPlay:  1. Go to  [email or web address].  2. Click on the Sign Up Now box, which takes you to the New Member Sign Up page. You will need to provide the following information:  a. Enter your FunGoPlay Access Code exactly as it appears at the top of this page. (You will not need to use this code after you’ve completed the sign-up process. If you do not sign up before the expiration date, you must request a new code.)   b. Enter your date of birth.   c. Enter your home email address.   d. Click Submit, and follow the next screen’s instructions.  3. Create a FunGoPlay ID. This will be your FunGoPlay login ID and cannot be changed, so think of one that is secure and easy to remember.  4. Create a FunGoPlay password. You can change your password at any time.  5. Enter your Password Reset Question and Answer. This can be used at a later time if you forget your password.   6. Enter your e-mail address. This allows you to receive e-mail notifications when new information is available in FunGoPlay.  7. Click Sign Up. You can now view your health  information.    For assistance activating your [employer] Software account, call [phone]

## 2017-04-05 ENCOUNTER — ANTICOAGULATION VISIT (OUTPATIENT)
Dept: VASCULAR LAB | Facility: MEDICAL CENTER | Age: 77
End: 2017-04-05
Attending: INTERNAL MEDICINE
Payer: COMMERCIAL

## 2017-04-05 VITALS — HEART RATE: 87 BPM | SYSTOLIC BLOOD PRESSURE: 153 MMHG | DIASTOLIC BLOOD PRESSURE: 97 MMHG

## 2017-04-05 DIAGNOSIS — I48.19 PERSISTENT ATRIAL FIBRILLATION (HCC): ICD-10-CM

## 2017-04-05 LAB — INR PPP: 2.6 (ref 2–3.5)

## 2017-04-05 PROCEDURE — 99211 OFF/OP EST MAY X REQ PHY/QHP: CPT

## 2017-04-05 PROCEDURE — 85610 PROTHROMBIN TIME: CPT

## 2017-04-05 NOTE — PROGRESS NOTES
Anticoagulation Summary as of 4/5/2017     INR goal 2.0-3.0   Selected INR 2.6 (4/5/2017)   Maintenance plan 2.5 mg (5 mg x 0.5) on Mon, Fri; 5 mg (5 mg x 1) all other days   Weekly total 30 mg   Plan last modified Matt Walters, PHARMD (2/1/2017)   Next INR check 4/28/2017   Target end date Indefinite    Indications   Persistent atrial fibrillation (CMS-HCC) [I48.1]         Anticoagulation Episode Summary     INR check location     Preferred lab     Send INR reminders to     Comments       Anticoagulation Care Providers     Provider Role Specialty Phone number    Dayo Nelson M.D. Referring Cardiology [phone]    [facility] Responsible  [phone]        Anticoagulation Patient Findings    Patient's INR was therapeutic today in clinic.    Pt denies any unusual s/s of bleeding, bruising, clotting or any changes to diet or medications.  Confirmed dosing regimen.  Pt is to continue with current warfarin dosing regimen.  BP still elevated, however pt states BP at home is usually around 120/80.  Instructed her to contact cardiology if she becomes elevated and stays elevated at home.    Follow up in 4 weeks.    Matt Walters, PHARMD

## 2017-04-05 NOTE — MR AVS SNAPSHOT
Rebekah Hayward   2017 7:45 AM   Anticoagulation Visit   MRN: [id number]    Department:  Vascular Medicine   Dept Phone:  [phone]    Description:  Female : 1940   Provider:  [facility] EXAM 4           Allergies as of 2017     Allergen Noted Reactions    Percocet [Oxycodone-Acetaminophen] 2014         You were diagnosed with     Persistent atrial fibrillation (CMS-HCC)   [573139]         Vital Signs     Smoking Status                   Never Smoker            Basic Information     Date Of Birth Sex Race Ethnicity Preferred Language    1940 Female White Non- English      Your appointments     2017  7:45 AM   Established Patient with [facility] EXAM 5   [facility]  (--)    [address]  [place] [address]   [phone]            2017 10:15 AM   PACER CHECK ONLY with PACER CHECK-CAM B   [facility] (--)    [address]  [place] [address]   [phone]            2017 10:45 AM   FOLLOW UP with Dayo Nelson M.D.   [facility] (--)    [address]  [place] [address]   [phone]              Problem List              ICD-10-CM Priority Class Noted - Resolved    Hypercholesterolemia E78.00 High  Unknown - Present    Essential hypertension, benign I10 High  Unknown - Present    Hypertrophic obstructive cardiomyopathy (CMS-HCC) I42.1 High  Unknown - Present    History of orthostatic hypotension Z86.79 High  Unknown - Present    Palpitations R00.2 High  Unknown - Present    Sinus bradycardia R00.1 High  Unknown - Present    History of digestive disorder Z87.19 High  Unknown - Present    HH (hiatus hernia) K44.9 High  Unknown - Present    Cardiac pacemaker in situ Z95.0 High  2012 - Present    Other and unspecified hyperlipidemia E78.5   2014 - Present    Persistent atrial  fibrillation (CMS-HCC) I48.1 High  1/21/2015 - Present    Dyslipidemia E78.5   1/20/2016 - Present      Health Maintenance        Date Due Completion Dates    IMM DTaP/Tdap/Td Vaccine (1 - Tdap) 9/12/1961 ---    PAP SMEAR 9/12/1963 ---    MAMMOGRAM 9/12/1982 ---    COLONOSCOPY 9/12/1992 ---    IMM ZOSTER VACCINE 9/12/2002 ---    BONE DENSITY 9/12/2007 ---    IMM PNEUMOCOCCAL 65+ (ADULT) LOW/MEDIUM RISK SERIES (1 of 2 - PCV13) 9/12/2007 ---            Results     POCT Protime      Component    INR    2.6                        Current Immunizations     No immunizations on file.      Below and/or attached are the medications your provider expects you to take. Review all of your home medications and newly ordered medications with your provider and/or pharmacist. Follow medication instructions as directed by your provider and/or pharmacist. Please keep your medication list with you and share with your provider. Update the information when medications are discontinued, doses are changed, or new medications (including over-the-counter products) are added; and carry medication information at all times in the event of emergency situations     Allergies:  PERCOCET - (reactions not documented)               Medications  Valid as of: April 05, 2017 -  7:45 AM    Generic Name Brand Name Tablet Size Instructions for use    Calcium-Magnesium-Vitamin D   Take 500 mg by mouth every day.        Carvedilol (Tab) COREG 25 MG Take 1 Tab by mouth 2 times a day, with meals.        Cholecalciferol (Tab) cholecalciferol 1000 UNIT Take 2,000 Units by mouth every day.        DiltiaZEM HCl Coated Beads (CAPSULE SR 24 HR) CARDIZEM  MG Take 1 Cap by mouth every day.        HydroCHLOROthiazide (Cap) MICROZIDE 12.5 MG TAKE 1 CAPSULE BY MOUTH EVERY DAY        Multiple Minerals-Vitamins (Tab) CITRACAL PLUS  Take 1 Tab by mouth every day.        Simvastatin (Tab) ZOCOR 20 MG Take 20 mg by mouth every evening.        Triamcinolone Acetonide  (Cream) KENALOG 0.1 % Apply  to affected area(s) every day.        Warfarin Sodium (Tab) COUMADIN 5 MG Take 1 Tab by mouth every day.        Warfarin Sodium (Tab) COUMADIN 5 MG Take one-half to one tablet by mouth one time daily as directed by coumadin clinic        .                 Medicines prescribed today were sent to:     [id number] - [place], [place] - [id number] [facility] [place] [address]    Phone: [phone] Fax: [phone]    Open 24 Hours?: No      Medication refill instructions:       If your prescription bottle indicates you have medication refills left, it is not necessary to call your provider’s office. Please contact your pharmacy and they will refill your medication.    If your prescription bottle indicates you do not have any refills left, you may request refills at any time through one of the following ways: The online [employer] system (except Urgent Care), by calling your provider’s office, or by asking your pharmacy to contact your provider’s office with a refill request. Medication refills are processed only during regular business hours and may not be available until the next business day. Your provider may request additional information or to have a follow-up visit with you prior to refilling your medication.   *Please Note: Medication refills are assigned a new Rx number when refilled electronically. Your pharmacy may indicate that no refills were authorized even though a new prescription for the same medication is available at the pharmacy. Please request the medicine by name with the pharmacy before contacting your provider for a refill.        Warfarin Dosing Calendar   April 2017 Details    Sun Mon Tue Wed Thu Fri Sat           1                 2               3               4               5   2.6   5 mg   See details      6      5 mg         7      2.5 mg         8      5 mg           9      5 mg         10      2.5 mg         11      5 mg         12         5 mg         13      5 mg         14      2.5 mg         15      5 mg           16      5 mg         17      2.5 mg         18      5 mg         19      5 mg         20      5 mg         21      2.5 mg         22      5 mg           23      5 mg         24      2.5 mg         25      5 mg         26      5 mg         27      5 mg         28      2.5 mg         29                 30                      Date Details   04/05 This INR check   INR: 2.6       Date of next INR:  4/28/2017         How to take your warfarin dose     To take:  2.5 mg Take 0.5 of a 5 mg tablet.    To take:  5 mg Take 1 of the 5 mg tablets.              [address] Access Code: [email or web address]  Expires: 4/24/2017 10:17 AM    [address]  A secure, online tool to manage your health information     [email or web address]’s [address]® is a secure, online tool that connects you to your personalized health information from the privacy of your home -- day or night - making it very easy for you to manage your healthcare. Once the activation process is completed, you can even access your medical information using the [address] mala, which is available for free in the Apple Mala store or Google Play store.     [address] provides the following levels of access (as shown below):   My Chart Features   [facility] Doctor [facility]ists [facility]  Urgent  Care Non-[facility]  Primary Care  Doctor   Email your healthcare team securely and privately 24/7 X X X    Manage appointments: schedule your next appointment; view details of past/upcoming appointments X      Request prescription refills. X      View recent personal medical records, including lab and immunizations X X X X   View health record, including health history, allergies, medications X X X X   Read reports about your outpatient visits, procedures, consult and ER notes X X X X   See your discharge summary, which is a recap of your hospital and/or ER visit that includes your diagnosis, lab results, and care  plan. X X       How to register for ResponseTap (formerly AdInsight):  1. Go to  [email or web address].  2. Click on the Sign Up Now box, which takes you to the New Member Sign Up page. You will need to provide the following information:  a. Enter your ResponseTap (formerly AdInsight) Access Code exactly as it appears at the top of this page. (You will not need to use this code after you’ve completed the sign-up process. If you do not sign up before the expiration date, you must request a new code.)   b. Enter your date of birth.   c. Enter your home email address.   d. Click Submit, and follow the next screen’s instructions.  3. Create a ResponseTap (formerly AdInsight) ID. This will be your ResponseTap (formerly AdInsight) login ID and cannot be changed, so think of one that is secure and easy to remember.  4. Create a WiSpryt password. You can change your password at any time.  5. Enter your Password Reset Question and Answer. This can be used at a later time if you forget your password.   6. Enter your e-mail address. This allows you to receive e-mail notifications when new information is available in ResponseTap (formerly AdInsight).  7. Click Sign Up. You can now view your health information.    For assistance activating your ResponseTap (formerly AdInsight) account, call [phone]

## 2017-04-07 LAB — INR BLD: 2.6 (ref 0.9–1.2)

## 2017-04-28 ENCOUNTER — ANTICOAGULATION VISIT (OUTPATIENT)
Dept: VASCULAR LAB | Facility: MEDICAL CENTER | Age: 77
End: 2017-04-28
Attending: INTERNAL MEDICINE
Payer: COMMERCIAL

## 2017-04-28 VITALS — HEART RATE: 69 BPM | SYSTOLIC BLOOD PRESSURE: 130 MMHG | DIASTOLIC BLOOD PRESSURE: 68 MMHG

## 2017-04-28 DIAGNOSIS — I48.19 PERSISTENT ATRIAL FIBRILLATION (HCC): ICD-10-CM

## 2017-04-28 LAB
INR BLD: 2.5 (ref 0.9–1.2)
INR PPP: 2.5 (ref 2–3.5)

## 2017-04-28 PROCEDURE — 85610 PROTHROMBIN TIME: CPT

## 2017-04-28 PROCEDURE — 99211 OFF/OP EST MAY X REQ PHY/QHP: CPT | Performed by: NURSE PRACTITIONER

## 2017-04-28 NOTE — PROGRESS NOTES
Anticoagulation Summary as of 4/28/2017     INR goal 2.0-3.0   Selected INR 2.5 (4/28/2017)   Maintenance plan 2.5 mg (5 mg x 0.5) on Mon, Fri; 5 mg (5 mg x 1) all other days   Weekly total 30 mg   No change documented Tiffany Tai, SANDRA.P.N.   Plan last modified Matt Walters, PHARMD (2/1/2017)   Next INR check 6/1/2017   Target end date Indefinite    Indications   Persistent atrial fibrillation (CMS-HCC) [I48.1]         Anticoagulation Episode Summary     INR check location     Preferred lab     Send INR reminders to     Comments       Anticoagulation Care Providers     Provider Role Specialty Phone number    Dayo Nelson M.D. Referring Cardiology [phone]    [facility] Responsible  [phone]        Patient is therapeutic today. Denies any medication or diet changes. No current symptoms of bleeding or thrombosis reported. Continue current regimen. Follow up in 5 weeks.    Next Appointment: Thursday, June 1, 2017 at 7:45 am.     Tiffany ZZAUETA

## 2017-04-28 NOTE — MR AVS SNAPSHOT
Rebekah Mcdonald Yesy   2017 7:45 AM   Anticoagulation Visit   MRN: [id number]    Department:  Vascular Medicine   Dept Phone:  [phone]    Description:  Female : 1940   Provider:  [facility] EXAM 5           Allergies as of 2017     Allergen Noted Reactions    Percocet [Oxycodone-Acetaminophen] 2014         You were diagnosed with     Persistent atrial fibrillation (CMS-HCC)   [302215]         Vital Signs     Smoking Status                   Never Smoker            Basic Information     Date Of Birth Sex Race Ethnicity Preferred Language    1940 Female White Non- English      Your appointments     2017  7:45 AM   Established Patient with [facility] EXAM 4   [facility]  (--)    [address]  [place] [address]   [phone]            2017 10:15 AM   PACER CHECK ONLY with PACER CHECK-CAM B   [facility] B (--)    [address]  [place] [address]   [phone]            2017 10:45 AM   FOLLOW UP with Dayo Nelson M.D.   [facility] B (--)    [address], [address]  [place] [address]   [phone]              Problem List              ICD-10-CM Priority Class Noted - Resolved    Hypercholesterolemia E78.00 High  Unknown - Present    Essential hypertension, benign I10 High  Unknown - Present    Hypertrophic obstructive cardiomyopathy (CMS-HCC) I42.1 High  Unknown - Present    History of orthostatic hypotension Z86.79 High  Unknown - Present    Palpitations R00.2 High  Unknown - Present    Sinus bradycardia R00.1 High  Unknown - Present    History of digestive disorder Z87.19 High  Unknown - Present    HH (hiatus hernia) K44.9 High  Unknown - Present    Cardiac pacemaker in situ Z95.0 High  2012 - Present    Other and unspecified hyperlipidemia E78.5   2014 - Present    Persistent atrial  fibrillation (CMS-HCC) I48.1 High  1/21/2015 - Present    Dyslipidemia E78.5   1/20/2016 - Present      Health Maintenance        Date Due Completion Dates    IMM DTaP/Tdap/Td Vaccine (1 - Tdap) 9/12/1961 ---    PAP SMEAR 9/12/1963 ---    MAMMOGRAM 9/12/1982 ---    COLONOSCOPY 9/12/1992 ---    IMM ZOSTER VACCINE 9/12/2002 ---    BONE DENSITY 9/12/2007 ---    IMM PNEUMOCOCCAL 65+ (ADULT) LOW/MEDIUM RISK SERIES (1 of 2 - PCV13) 9/12/2007 ---            Results     POCT Protime      Component    INR    2.5                        Current Immunizations     No immunizations on file.      Below and/or attached are the medications your provider expects you to take. Review all of your home medications and newly ordered medications with your provider and/or pharmacist. Follow medication instructions as directed by your provider and/or pharmacist. Please keep your medication list with you and share with your provider. Update the information when medications are discontinued, doses are changed, or new medications (including over-the-counter products) are added; and carry medication information at all times in the event of emergency situations     Allergies:  PERCOCET - (reactions not documented)               Medications  Valid as of: April 28, 2017 -  7:46 AM    Generic Name Brand Name Tablet Size Instructions for use    Calcium-Magnesium-Vitamin D   Take 500 mg by mouth every day.        Carvedilol (Tab) COREG 25 MG Take 1 Tab by mouth 2 times a day, with meals.        Cholecalciferol (Tab) cholecalciferol 1000 UNIT Take 2,000 Units by mouth every day.        DilTIAZem HCl Coated Beads (CAPSULE SR 24 HR) CARDIZEM  MG Take 1 Cap by mouth every day.        HydroCHLOROthiazide (Cap) MICROZIDE 12.5 MG TAKE 1 CAPSULE BY MOUTH EVERY DAY        Multiple Minerals-Vitamins (Tab) CITRACAL PLUS  Take 1 Tab by mouth every day.        Simvastatin (Tab) ZOCOR 20 MG Take 20 mg by mouth every evening.        Triamcinolone Acetonide  (Cream) KENALOG 0.1 % Apply  to affected area(s) every day.        Warfarin Sodium (Tab) COUMADIN 5 MG Take 1 Tab by mouth every day.        Warfarin Sodium (Tab) COUMADIN 5 MG Take one-half to one tablet by mouth one time daily as directed by coumadin clinic        .                 Medicines prescribed today were sent to:     [id number] - [place], [place] - [id number] [facility] [place] [address]    Phone: [phone] Fax: [phone]    Open 24 Hours?: No      Medication refill instructions:       If your prescription bottle indicates you have medication refills left, it is not necessary to call your provider’s office. Please contact your pharmacy and they will refill your medication.    If your prescription bottle indicates you do not have any refills left, you may request refills at any time through one of the following ways: The online [employer] system (except Urgent Care), by calling your provider’s office, or by asking your pharmacy to contact your provider’s office with a refill request. Medication refills are processed only during regular business hours and may not be available until the next business day. Your provider may request additional information or to have a follow-up visit with you prior to refilling your medication.   *Please Note: Medication refills are assigned a new Rx number when refilled electronically. Your pharmacy may indicate that no refills were authorized even though a new prescription for the same medication is available at the pharmacy. Please request the medicine by name with the pharmacy before contacting your provider for a refill.        Warfarin Dosing Calendar   April 2017 Details    Sun Mon Tue Wed Thu Fri Sat           1                 2               3               4               5               6               7               8                 9               10               11               12               13               14                15                 16               17               18               19               20               21               22                 23               24               25               26               27               28   2.5   2.5 mg   See details      29      5 mg           30      5 mg                Date Details   04/28 This INR check   INR: 2.5               How to take your warfarin dose     To take:  2.5 mg Take 0.5 of a 5 mg tablet.    To take:  5 mg Take 1 of the 5 mg tablets.           Warfarin Dosing Calendar   May 2017 Details    Sun Mon Tue Wed Thu Fri Sat      1      2.5 mg         2      5 mg         3      5 mg         4      5 mg         5      2.5 mg         6      5 mg           7      5 mg         8      2.5 mg         9      5 mg         10      5 mg         11      5 mg         12      2.5 mg         13      5 mg           14      5 mg         15      2.5 mg         16      5 mg         17      5 mg         18      5 mg         19      2.5 mg         20      5 mg           21      5 mg         22      2.5 mg         23      5 mg         24      5 mg         25      5 mg         26      2.5 mg         27      5 mg           28      5 mg         29      2.5 mg         30      5 mg         31      5 mg             Date Details   No additional details            How to take your warfarin dose     To take:  2.5 mg Take 0.5 of a 5 mg tablet.    To take:  5 mg Take 1 of the 5 mg tablets.           Warfarin Dosing Calendar   June 2017 Details    Sun Mon Tue Wed Thu Fri Sat         1      5 mg         2               3                 4               5               6               7               8               9               10                 11               12               13               14               15               16               17                 18               19               20               21               22               23               24                 25                  26               27               28               29               30                 Date Details   No additional details    Date of next INR:  6/1/2017         How to take your warfarin dose     To take:  5 mg Take 1 of the 5 mg tablets.              [employer] Access Code: [email or web address]  Expires: 5/28/2017  7:46 AM    [employer]  A secure, online tool to manage your health information     [employer]’s [employer]® is a secure, online tool that connects you to your personalized health information from the privacy of your home -- day or night - making it very easy for you to manage your healthcare. Once the activation process is completed, you can even access your medical information using the [employer] mala, which is available for free in the Apple Mala store or Google Play store.     [employer] provides the following levels of access (as shown below):   My Chart Features   [facility] Doctor [facility]  Specialists [facility]  Urgent  Care Non-[facility]  Primary Care  Doctor   Email your healthcare team securely and privately 24/7 X X X    Manage appointments: schedule your next appointment; view details of past/upcoming appointments X      Request prescription refills. X      View recent personal medical records, including lab and immunizations X X X X   View health record, including health history, allergies, medications X X X X   Read reports about your outpatient visits, procedures, consult and ER notes X X X X   See your discharge summary, which is a recap of your hospital and/or ER visit that includes your diagnosis, lab results, and care plan. X X       How to register for [employer]:  1. Go to  [email or web address].  2. Click on the Sign Up Now box, which takes you to the New Member Sign Up page. You will need to provide the following information:  a. Enter your [employer] Access Code exactly as it appears at the top of this page. (You will not need to use this code after you’ve completed the sign-up process.  If you do not sign up before the expiration date, you must request a new code.)   b. Enter your date of birth.   c. Enter your home email address.   d. Click Submit, and follow the next screen’s instructions.  3. Create a 2Peer (Qlipso) ID. This will be your 2Peer (Qlipso) login ID and cannot be changed, so think of one that is secure and easy to remember.  4. Create a 2Peer (Qlipso) password. You can change your password at any time.  5. Enter your Password Reset Question and Answer. This can be used at a later time if you forget your password.   6. Enter your e-mail address. This allows you to receive e-mail notifications when new information is available in 2Peer (Qlipso).  7. Click Sign Up. You can now view your health information.    For assistance activating your [employer]Peer (Qlip[employer]) account, call [phone]

## 2017-06-01 ENCOUNTER — ANTICOAGULATION VISIT (OUTPATIENT)
Dept: VASCULAR LAB | Facility: MEDICAL CENTER | Age: 77
End: 2017-06-01
Attending: INTERNAL MEDICINE
Payer: COMMERCIAL

## 2017-06-01 DIAGNOSIS — I48.19 PERSISTENT ATRIAL FIBRILLATION (HCC): ICD-10-CM

## 2017-06-01 LAB
INR BLD: 2 (ref 0.9–1.2)
INR PPP: 2 (ref 2–3.5)

## 2017-06-01 PROCEDURE — 85610 PROTHROMBIN TIME: CPT

## 2017-06-01 PROCEDURE — 99211 OFF/OP EST MAY X REQ PHY/QHP: CPT

## 2017-06-01 NOTE — PROGRESS NOTES
Anticoagulation Summary as of 6/1/2017     INR goal 2.0-3.0   Selected INR 2.0 (6/1/2017)   Maintenance plan 2.5 mg (5 mg x 0.5) on Mon, Fri; 5 mg (5 mg x 1) all other days   Weekly total 30 mg   Plan last modified Matt Walters, PHARMD (2/1/2017)   Next INR check 7/13/2017   Target end date Indefinite    Indications   Persistent atrial fibrillation (CMS-HCC) [I48.1]         Anticoagulation Episode Summary     INR check location     Preferred lab     Send INR reminders to     Comments       Anticoagulation Care Providers     Provider Role Specialty Phone number    Dayo Nelson M.D. Referring Cardiology [phone]    [facility] Responsible  [phone]        Anticoagulation Patient Findings      Current Outpatient Prescriptions on File Prior to Visit   Medication Sig Dispense Refill   • warfarin (COUMADIN) 5 MG Tab Take one-half to one tablet by mouth one time daily as directed by coumadin clinic 90 Tab 1   • carvedilol (COREG) 25 MG Tab Take 1 Tab by mouth 2 times a day, with meals. 180 Tab 3   • hydrochlorothiazide (MICROZIDE) 12.5 MG capsule TAKE 1 CAPSULE BY MOUTH EVERY DAY 90 Cap 1   • diltiazem CD (CARDIZEM CD) 240 MG CAPSULE SR 24 HR Take 1 Cap by mouth every day. 30 Cap 3   • warfarin (COUMADIN) 5 MG Tab Take 1 Tab by mouth every day. 100 Tab 3   • triamcinolone acetonide (KENALOG) 0.1 % CREA Apply  to affected area(s) every day.     • Calcium Carbonate (CALCIUM 500 PO) Take 500 mg by mouth every day.     • Multiple Minerals-Vitamins (CITRACAL PLUS) TABS Take 1 Tab by mouth every day.     • simvastatin (ZOCOR) 20 MG TABS Take 20 mg by mouth every evening.     • vitamin D (CHOLECALCIFEROL) 1000 UNIT TABS Take 2,000 Units by mouth every day.       No current facility-administered medications on file prior to visit.       No results found for: SODIUM, POTASSIUM, CHLORIDE, CO2, GLUCOSE, BUN, CREATININE, BUNCREATRAT, GLOMRATE       Rebekah Hayward seen in clinic today  INR   therapeutic.    Denies signs/symptoms of bleeding and/or thrombosis.    Denies changes to diet or medications.   Follow up appointment in 6 week(s).      Continue weekly warfarin dose as noted     Ovi Pabon, PHARMD

## 2017-06-01 NOTE — MR AVS SNAPSHOT
Rebekah Hernandezbins   2017 7:45 AM   Anticoagulation Visit   MRN: [id number]    Department:  Vascular Medicine   Dept Phone:  [phone]    Description:  Female : 1940   Provider:  V EXAM 4           Allergies as of 2017     Allergen Noted Reactions    Percocet [Oxycodone-Acetaminophen] 2014         You were diagnosed with     Persistent atrial fibrillation (CMS-HCC)   [205882]         Vital Signs     Smoking Status                   Never Smoker            Basic Information     Date Of Birth Sex Race Ethnicity Preferred Language    1940 Female White Non- English      Your appointments     2017  7:45 AM   Established Patient with [facility] EXAM 4   [facility]  (--)    [address]  [place] [address]   [phone]            2017  9:45 AM   Established Patient with [facility] EXAM 4   [facility]  (--)    [address]  [place] [address]   [phone]            2017 10:15 AM   PACER CHECK ONLY with PACER CHECK-CAM B   [facility] B (--)    [address]  [place] [id number] [phone]            2017 10:45 AM   FOLLOW UP with Dayo Nelson M.D.   [facility] B (--)    [address]  [place] [address]   [phone]              Problem List              ICD-10-CM Priority Class Noted - Resolved    Hypercholesterolemia E78.00 High  Unknown - Present    Essential hypertension, benign I10 High  Unknown - Present    Hypertrophic obstructive cardiomyopathy (CMS-HCC) I42.1 High  Unknown - Present    History of orthostatic hypotension Z86.79 High  Unknown - Present    Palpitations R00.2 High  Unknown - Present    Sinus bradycardia R00.1 High  Unknown - Present    History of digestive disorder Z87.19 High  Unknown - Present    HH  (hiatus hernia) K44.9 High  Unknown - Present    Cardiac pacemaker in situ Z95.0 High  5/8/2012 - Present    Other and unspecified hyperlipidemia E78.5   9/30/2014 - Present    Persistent atrial fibrillation (CMS-HCC) I48.1 High  1/21/2015 - Present    Dyslipidemia E78.5   1/20/2016 - Present      Health Maintenance        Date Due Completion Dates    IMM DTaP/Tdap/Td Vaccine (1 - Tdap) 9/12/1961 ---    PAP SMEAR 9/12/1963 ---    MAMMOGRAM 9/12/1982 ---    COLONOSCOPY 9/12/1992 ---    IMM ZOSTER VACCINE 9/12/2002 ---    BONE DENSITY 9/12/2007 ---    IMM PNEUMOCOCCAL 65+ (ADULT) LOW/MEDIUM RISK SERIES (1 of 2 - PCV13) 9/12/2007 ---            Results     POCT Protime      Component    INR    2.0                        Current Immunizations     No immunizations on file.      Below and/or attached are the medications your provider expects you to take. Review all of your home medications and newly ordered medications with your provider and/or pharmacist. Follow medication instructions as directed by your provider and/or pharmacist. Please keep your medication list with you and share with your provider. Update the information when medications are discontinued, doses are changed, or new medications (including over-the-counter products) are added; and carry medication information at all times in the event of emergency situations     Allergies:  PERCOCET - (reactions not documented)               Medications  Valid as of: June 01, 2017 -  7:41 AM    Generic Name Brand Name Tablet Size Instructions for use    Calcium-Magnesium-Vitamin D   Take 500 mg by mouth every day.        Carvedilol (Tab) COREG 25 MG Take 1 Tab by mouth 2 times a day, with meals.        Cholecalciferol (Tab) cholecalciferol 1000 UNIT Take 2,000 Units by mouth every day.        DilTIAZem HCl Coated Beads (CAPSULE SR 24 HR) CARDIZEM  MG Take 1 Cap by mouth every day.        HydroCHLOROthiazide (Cap) MICROZIDE 12.5 MG TAKE 1 CAPSULE BY MOUTH EVERY  DAY        Multiple Minerals-Vitamins (Tab) CITRACAL PLUS  Take 1 Tab by mouth every day.        Simvastatin (Tab) ZOCOR 20 MG Take 20 mg by mouth every evening.        Triamcinolone Acetonide (Cream) KENALOG 0.1 % Apply  to affected area(s) every day.        Warfarin Sodium (Tab) COUMADIN 5 MG Take 1 Tab by mouth every day.        Warfarin Sodium (Tab) COUMADIN 5 MG Take one-half to one tablet by mouth one time daily as directed by coumadin clinic        .                 Medicines prescribed today were sent to:     [id number] - [place], [place] - [id number] [facility] [place] [address]    Phone: [phone] Fax: [phone]    Open 24 Hours?: No      Medication refill instructions:       If your prescription bottle indicates you have medication refills left, it is not necessary to call your provider’s office. Please contact your pharmacy and they will refill your medication.    If your prescription bottle indicates you do not have any refills left, you may request refills at any time through one of the following ways: The online [employer] system (except Urgent Care), by calling your provider’s office, or by asking your pharmacy to contact your provider’s office with a refill request. Medication refills are processed only during regular business hours and may not be available until the next business day. Your provider may request additional information or to have a follow-up visit with you prior to refilling your medication.   *Please Note: Medication refills are assigned a new Rx number when refilled electronically. Your pharmacy may indicate that no refills were authorized even though a new prescription for the same medication is available at the pharmacy. Please request the medicine by name with the pharmacy before contacting your provider for a refill.        Warfarin Dosing Calendar   June 2017 Details    Sun Mon Tue Wed Thu Fri Sat         1   2.0   5 mg   See details      2         2.5 mg         3      5 mg           4      5 mg         5      2.5 mg         6      5 mg         7      5 mg         8      5 mg         9      2.5 mg         10      5 mg           11      5 mg         12      2.5 mg         13      5 mg         14      5 mg         15      5 mg         16      2.5 mg         17      5 mg           18      5 mg         19      2.5 mg         20      5 mg         21      5 mg         22      5 mg         23      2.5 mg         24      5 mg           25      5 mg         26      2.5 mg         27      5 mg         28      5 mg         29      5 mg         30      2.5 mg           Date Details   06/01 This INR check   INR: 2.0               How to take your warfarin dose     To take:  2.5 mg Take 0.5 of a 5 mg tablet.    To take:  5 mg Take 1 of the 5 mg tablets.           Warfarin Dosing Calendar   July 2017 Details    Sun Mon Tue Wed Thu Fri Sat           1      5 mg           2      5 mg         3      2.5 mg         4      5 mg         5      5 mg         6      5 mg         7      2.5 mg         8      5 mg           9      5 mg         10      2.5 mg         11      5 mg         12      5 mg         13      5 mg         14               15                 16               17               18               19               20               21               22                 23               24               25               26               27               28               29                 30               31                     Date Details   No additional details    Date of next INR:  7/13/2017         How to take your warfarin dose     To take:  2.5 mg Take 0.5 of a 5 mg tablet.    To take:  5 mg Take 1 of the 5 mg tablets.              Authy Access Code: [email or web address]  Expires: 7/1/2017  7:41 AM    [employer]  A secure, online tool to manage your health information     jellyfishs Authy® is a secure, online tool that connects you to your "[employer] health  information from the privacy of your home -- day or night - making it very easy for you to manage your healthcare. Once the activation process is completed, you can even access your medical information using the [employer] mala, which is available for free in the Apple Mala store or Google Play store.     [employer] provides the following levels of access (as shown below):   My Chart Features   [facility] Doctor [facility]  Specialists [facility]  Urgent  Care [facility]  Primary Care  Doctor   Email your healthcare team securely and privately 24/7 X X X    Manage appointments: schedule your next appointment; view details of past/upcoming appointments X      Request prescription refills. X      View recent personal medical records, including lab and immunizations X X X X   View health record, including health history, allergies, medications X X X X   Read reports about your outpatient visits, procedures, consult and ER notes X X X X   See your discharge summary, which is a recap of your hospital and/or ER visit that includes your diagnosis, lab results, and care plan. X X       How to register for [employer]:  1. Go to  [email or web address].  2. Click on the Sign Up Now box, which takes you to the New Member Sign Up page. You will need to provide the following information:  a. Enter your [employer] Health Care Access Code exactly as it appears at the top of this page. (You will not need to use this code after you’ve completed the sign-up process. If you do not sign up before the expiration date, you must request a new code.)   b. Enter your date of birth.   c. Enter your home email address.   d. Click Submit, and follow the next screen’s instructions.  3. Create a [employer] Health Care ID. This will be your [employer] Health Care login ID and cannot be changed, so think of one that is secure and easy to remember.  4. Create a [employer] password. You can change your password at any time.  5. Enter your Password Reset Question and Answer. This can be used at a later  time if you forget your password.   6. Enter your e-mail address. This allows you to receive e-mail notifications when new information is available in [employer].  7. Click Sign Up. You can now view your health information.    For assistance activating your [employer] account, call [phone]

## 2017-06-19 ENCOUNTER — OFFICE VISIT (OUTPATIENT)
Dept: CARDIOLOGY | Facility: MEDICAL CENTER | Age: 77
End: 2017-06-19
Payer: COMMERCIAL

## 2017-06-19 VITALS
BODY MASS INDEX: 25.91 KG/M2 | SYSTOLIC BLOOD PRESSURE: 134 MMHG | WEIGHT: 171 LBS | OXYGEN SATURATION: 96 % | HEART RATE: 88 BPM | HEIGHT: 68 IN | DIASTOLIC BLOOD PRESSURE: 74 MMHG

## 2017-06-19 DIAGNOSIS — I10 ESSENTIAL HYPERTENSION, BENIGN: ICD-10-CM

## 2017-06-19 DIAGNOSIS — I48.19 PERSISTENT ATRIAL FIBRILLATION (HCC): ICD-10-CM

## 2017-06-19 DIAGNOSIS — I87.2 VENOUS INSUFFICIENCY OF BOTH LOWER EXTREMITIES: Chronic | ICD-10-CM

## 2017-06-19 DIAGNOSIS — Z95.0 CARDIAC PACEMAKER IN SITU: ICD-10-CM

## 2017-06-19 DIAGNOSIS — I42.1 HYPERTROPHIC OBSTRUCTIVE CARDIOMYOPATHY (HOCM) (HCC): ICD-10-CM

## 2017-06-19 PROCEDURE — 99214 OFFICE O/P EST MOD 30 MIN: CPT | Performed by: INTERNAL MEDICINE

## 2017-06-19 NOTE — Clinical Note
[facility]   [address]  [place] [address]  Phone: [phone]  Fax: [phone]              Rebekah Hayward  1940    Encounter Date: 6/19/2017    Dayo Nelson M.D.          PROGRESS NOTE:  Subjective:   Rebekah Hayward is a 76 y.o. 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 also with pacemaker with slow ventricular response and chronic atrial fibrillation    Overall she's been doing quite well no significant dyspnea no major changes since her last follow-up      Her main concern is continued chronic leg heaviness with some achiness at the end of the day from venous insufficiency    Past Medical History   Diagnosis Date   • Atrial fibrillation (CMS-HCC)    • Hypercholesterolemia    • Hypertension    • Hypertrophic obstructive cardiomyopathy (CMS-HCC)    • Murmur    • Orthostatic hypotension    • Palpitations    • Sinus bradycardia    • History of digestive disorder 06/13/2007     Common duct dilation, status post ERCP and stent placement.   • HH (hiatus hernia) 08/2007     History of Paraesophageal Hiatus Hernia Repair.   • A-fib (CMS-HCC) 1/21/2015   • Cardiac pacemaker in situ 5/8/2012 July 2011:. [employer] Altrua 60 [id number] implanted by Dr. Jose Sierra.    • History of orthostatic hypotension    • HTN (hypertension)    • DAVID (obstructive sleep apnea)    • Atrial fibrillation (CMS-HCC)    • Venous insufficiency of both lower extremities      Past Surgical History   Procedure Laterality Date   • Pacemaker insertion  July 2011     [employer] Altrua 60 [id number] implanted by Dr. Jose Sierra.   • Cholecystectomy  12/2006     Laparscopic   • Knee replacement, total  01/2006     Left, by Dr. De Paz   • Other orthopedic surgery  January 2013     Left elbow, by Dr. De Paz   • Hysterectomy radical     • Appendectomy     • Hemorrhoidectomy       Family History   Problem  Relation Age of Onset   • Heart Disease Father      Cardiac defibriliator, & CABG   • Heart Attack Father      Acute MI     History   Smoking status   • Never Smoker    Smokeless tobacco   • Never Used     Allergies   Allergen Reactions   • Percocet [Oxycodone-Acetaminophen]      Outpatient Encounter Prescriptions as of 6/19/2017   Medication Sig Dispense Refill   • warfarin (COUMADIN) 5 MG Tab Take one-half to one tablet by mouth one time daily as directed by coumadin clinic 90 Tab 1   • carvedilol (COREG) 25 MG Tab Take 1 Tab by mouth 2 times a day, with meals. 180 Tab 3   • hydrochlorothiazide (MICROZIDE) 12.5 MG capsule TAKE 1 CAPSULE BY MOUTH EVERY DAY 90 Cap 1   • diltiazem CD (CARDIZEM CD) 240 MG CAPSULE SR 24 HR Take 1 Cap by mouth every day. 30 Cap 3   • warfarin (COUMADIN) 5 MG Tab Take 1 Tab by mouth every day. 100 Tab 3   • triamcinolone acetonide (KENALOG) 0.1 % CREA Apply  to affected area(s) every day.     • Calcium Carbonate (CALCIUM 500 PO) Take 500 mg by mouth every day.     • Multiple Minerals-Vitamins (CITRACAL PLUS) TABS Take 1 Tab by mouth every day.     • simvastatin (ZOCOR) 20 MG TABS Take 20 mg by mouth every evening.     • vitamin D (CHOLECALCIFEROL) 1000 UNIT TABS Take 2,000 Units by mouth every day.       No facility-administered encounter medications on file as of 6/19/2017.     Review of Systems   Constitutional: Negative for fever and chills.   HENT: Negative for sore throat.    Eyes: Negative for blurred vision.   Respiratory: Negative for cough and shortness of breath.    Cardiovascular: Negative for chest pain, palpitations, claudication, leg swelling and PND.   Gastrointestinal: Negative for nausea and abdominal pain.   Musculoskeletal: Negative for falls.   Skin: Negative for rash.   Neurological: Negative for dizziness, focal weakness, loss of consciousness, weakness and headaches.   Endo/Heme/Allergies: Does not bruise/bleed easily.        Objective:   /74 mmHg  Pulse 88  " Ht 1.727 m (5' 7.99\")  Wt 77.565 kg (171 lb)  BMI 26.01 kg/m2  SpO2 96%    Physical Exam   Constitutional: No distress.   HENT:   Mouth/Throat: Oropharynx is clear and moist.   Eyes: No scleral icterus.   Cardiovascular: Normal rate, regular rhythm and intact distal pulses.  Exam reveals no gallop and no friction rub.    No murmur heard.  Pulmonary/Chest: Effort normal and breath sounds normal. She has no rales.   Abdominal: Bowel sounds are normal. There is no tenderness.   Musculoskeletal: She exhibits no edema.   Neurological: She is alert.   Skin: No rash noted. She is not diaphoretic.   Psychiatric: She has a normal mood and affect.       Assessment:     1. Cardiac pacemaker in situ     2. Essential hypertension, benign     3. Hypertrophic obstructive cardiomyopathy (HOCM) (CMS-HCC)     4. Persistent atrial fibrillation (CMS-HCC)     5. Venous insufficiency of both lower extremities  REFERRAL TO VASCULAR SURGERY       Medical Decision Making:  Today's Assessment / Status / Plan:     It was my pleasure to meet with Ms. Hayward.    For her heart she's been doing well she does have features of significant varicosities in the legs with features of venous insufficiency she like to discuss further with vascular surgery for possible invasive approach to treatment she has used compression socks in the past but has been impaled to use this during the summer    I will see Ms. Hayward back in 6 months time and encouraged her to follow up with us over the phone or e-mail using my MyChart as issues arise.    It is my pleasure to participate in the care of Ms. Hayward.  Please do not hesitate to contact me with questions or concerns.    Dayo Nelson MD PhD FACC  Cardiologist [facility]        Marti GOMES M.D.  [address]  [address]  [place] [address]  VIA Facsimile: [phone]                   "

## 2017-06-19 NOTE — MR AVS SNAPSHOT
"        Rebekah Hayward   2017 3:00 PM   Office Visit   MRN: [id number]    Department:  [facility]   Dept Phone:  [phone]    Description:  Female : 1940   Provider:  Dayo Nelson M.D.           Reason for Visit     Follow-Up           Allergies as of 2017     Allergen Noted Reactions    Percocet [Oxycodone-Acetaminophen] 2014         You were diagnosed with     Cardiac pacemaker in situ   [V45.01.ICD-9-CM]       Essential hypertension, benign   [401.1.ICD-9-CM]       Hypertrophic obstructive cardiomyopathy (HOCM) (CMS-HCC)   [090794]       Persistent atrial fibrillation (CMS-HCC)   [899463]       Venous insufficiency of both lower extremities   [3799642]         Vital Signs     Blood Pressure Pulse Height Weight Body Mass Index Oxygen Saturation    134/74 mmHg 88 1.727 m (5' 7.99\") 77.565 kg (171 lb) 26.01 kg/m2 96%    Smoking Status                   Never Smoker            Basic Information     Date Of Birth Sex Race Ethnicity Preferred Language    1940 Female White Non- English      Your appointments     2017  9:45 AM   Established Patient with [facility] EXAM 4   [facility]  (--)    [address]  [place] [address]   [phone]            Dec 06, 2017  2:45 PM   PACER CHECK ONLY with PACER CHECK-[facility]   [facility] (--)    [address], [address]  [place] [id number] [phone]            Dec 06, 2017  3:00 PM   FOLLOW UP with Dayo Nelson M.D.   [facility] (--)    [address]  [place] [id number] [phone]              Problem List              ICD-10-CM Priority Class Noted - Resolved    Hypercholesterolemia E78.00 High  Unknown - Present    Essential hypertension, benign I10 High  Unknown - Present    Hypertrophic obstructive cardiomyopathy (HOCM) (CMS-HCC) I42.1 High  Unknown - " Present    History of orthostatic hypotension Z86.79 High  Unknown - Present    Palpitations R00.2 High  Unknown - Present    Sinus bradycardia R00.1 High  Unknown - Present    History of digestive disorder Z87.19 High  Unknown - Present    HH (hiatus hernia) K44.9 High  Unknown - Present    Cardiac pacemaker in situ Z95.0 High  5/8/2012 - Present    Other and unspecified hyperlipidemia E78.5   9/30/2014 - Present    Persistent atrial fibrillation (CMS-HCC) I48.1 High  1/21/2015 - Present    Dyslipidemia E78.5   1/20/2016 - Present    Venous insufficiency of both lower extremities (Chronic) I87.2   Unknown - Present      Health Maintenance        Date Due Completion Dates    IMM DTaP/Tdap/Td Vaccine (1 - Tdap) 9/12/1961 ---    PAP SMEAR 9/12/1963 ---    MAMMOGRAM 9/12/1982 ---    COLONOSCOPY 9/12/1992 ---    IMM ZOSTER VACCINE 9/12/2002 ---    BONE DENSITY 9/12/2007 ---    IMM PNEUMOCOCCAL 65+ (ADULT) LOW/MEDIUM RISK SERIES (1 of 2 - PCV13) 9/12/2007 ---            Current Immunizations     No immunizations on file.      Below and/or attached are the medications your provider expects you to take. Review all of your home medications and newly ordered medications with your provider and/or pharmacist. Follow medication instructions as directed by your provider and/or pharmacist. Please keep your medication list with you and share with your provider. Update the information when medications are discontinued, doses are changed, or new medications (including over-the-counter products) are added; and carry medication information at all times in the event of emergency situations     Allergies:  PERCOCET - (reactions not documented)               Medications  Valid as of: June 19, 2017 -  4:19 PM    Generic Name Brand Name Tablet Size Instructions for use    Calcium-Magnesium-Vitamin D   Take 500 mg by mouth every day.        Carvedilol (Tab) COREG 25 MG Take 1 Tab by mouth 2 times a day, with meals.        Cholecalciferol  (Tab) cholecalciferol 1000 UNIT Take 2,000 Units by mouth every day.        DilTIAZem HCl Coated Beads (CAPSULE SR 24 HR) CARDIZEM  MG Take 1 Cap by mouth every day.        HydroCHLOROthiazide (Cap) MICROZIDE 12.5 MG TAKE 1 CAPSULE BY MOUTH EVERY DAY        Multiple Minerals-Vitamins (Tab) CITRACAL PLUS  Take 1 Tab by mouth every day.        Simvastatin (Tab) ZOCOR 20 MG Take 20 mg by mouth every evening.        Triamcinolone Acetonide (Cream) KENALOG 0.1 % Apply  to affected area(s) every day.        Warfarin Sodium (Tab) COUMADIN 5 MG Take 1 Tab by mouth every day.        Warfarin Sodium (Tab) COUMADIN 5 MG Take one-half to one tablet by mouth one time daily as directed by coumadin clinic        .                 Medicines prescribed today were sent to:     [id number] - [place], [place] - [id number] [facility] [address]    Phone: [phone] Fax: [phone]    Open 24 Hours?: No      Medication refill instructions:       If your prescription bottle indicates you have medication refills left, it is not necessary to call your provider’s office. Please contact your pharmacy and they will refill your medication.    If your prescription bottle indicates you do not have any refills left, you may request refills at any time through one of the following ways: The online [employer] system (except Urgent Care), by calling your provider’s office, or by asking your pharmacy to contact your provider’s office with a refill request. Medication refills are processed only during regular business hours and may not be available until the next business day. Your provider may request additional information or to have a follow-up visit with you prior to refilling your medication.   *Please Note: Medication refills are assigned a new Rx number when refilled electronically. Your pharmacy may indicate that no refills were authorized even though a new prescription for the same medication is  available at the pharmacy. Please request the medicine by name with the pharmacy before contacting your provider for a refill.        Referral     A referral request has been sent to our patient care coordination department. Please allow 3-5 business days for us to process this request and contact you either by phone or mail. If you do not hear from us by the 5th business day, please call us at [phone].           MyChart Status: Patient Declined

## 2017-06-20 ASSESSMENT — ENCOUNTER SYMPTOMS
FALLS: 0
BLURRED VISION: 0
COUGH: 0
CHILLS: 0
NAUSEA: 0
FOCAL WEAKNESS: 0
BRUISES/BLEEDS EASILY: 0
DIZZINESS: 0
SHORTNESS OF BREATH: 0
HEADACHES: 0
FEVER: 0
SORE THROAT: 0
CLAUDICATION: 0
LOSS OF CONSCIOUSNESS: 0
WEAKNESS: 0
ABDOMINAL PAIN: 0
PALPITATIONS: 0
PND: 0

## 2017-06-20 NOTE — PROGRESS NOTES
Subjective:   Rebekah Hayward is a 76 y.o. 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 also with pacemaker with slow ventricular response and chronic atrial fibrillation    Overall she's been doing quite well no significant dyspnea no major changes since her last follow-up      Her main concern is continued chronic leg heaviness with some achiness at the end of the day from venous insufficiency    Past Medical History   Diagnosis Date   • Atrial fibrillation (CMS-HCC)    • Hypercholesterolemia    • Hypertension    • Hypertrophic obstructive cardiomyopathy (CMS-HCC)    • Murmur    • Orthostatic hypotension    • Palpitations    • Sinus bradycardia    • History of digestive disorder 06/13/2007     Common duct dilation, status post ERCP and stent placement.   • HH (hiatus hernia) 08/2007     History of Paraesophageal Hiatus Hernia Repair.   • A-fib (CMS-HCC) 1/21/2015   • Cardiac pacemaker in situ 5/8/2012 July 2011:. [employer] Altrua 60 [id number] implanted by Dr. Jose Sierra.    • History of orthostatic hypotension    • HTN (hypertension)    • DAVID (obstructive sleep apnea)    • Atrial fibrillation (CMS-HCC)    • Venous insufficiency of both lower extremities      Past Surgical History   Procedure Laterality Date   • Pacemaker insertion  July 2011     [employer] Altrua 60 [id number] implanted by Dr. Jose Sierra.   • Cholecystectomy  12/2006     Laparscopic   • Knee replacement, total  01/2006     Left, by Dr. De Paz   • Other orthopedic surgery  January 2013     Left elbow, by Dr. De Paz   • Hysterectomy radical     • Appendectomy     • Hemorrhoidectomy       Family History   Problem Relation Age of Onset   • Heart Disease Father      Cardiac defibriliator, & CABG   • Heart Attack Father      Acute MI     History   Smoking status   • Never Smoker    Smokeless tobacco   • Never Used     Allergies   Allergen Reactions   • Percocet [Oxycodone-Acetaminophen]   "    Outpatient Encounter Prescriptions as of 6/19/2017   Medication Sig Dispense Refill   • warfarin (COUMADIN) 5 MG Tab Take one-half to one tablet by mouth one time daily as directed by coumadin clinic 90 Tab 1   • carvedilol (COREG) 25 MG Tab Take 1 Tab by mouth 2 times a day, with meals. 180 Tab 3   • hydrochlorothiazide (MICROZIDE) 12.5 MG capsule TAKE 1 CAPSULE BY MOUTH EVERY DAY 90 Cap 1   • diltiazem CD (CARDIZEM CD) 240 MG CAPSULE SR 24 HR Take 1 Cap by mouth every day. 30 Cap 3   • warfarin (COUMADIN) 5 MG Tab Take 1 Tab by mouth every day. 100 Tab 3   • triamcinolone acetonide (KENALOG) 0.1 % CREA Apply  to affected area(s) every day.     • Calcium Carbonate (CALCIUM 500 PO) Take 500 mg by mouth every day.     • Multiple Minerals-Vitamins (CITRACAL PLUS) TABS Take 1 Tab by mouth every day.     • simvastatin (ZOCOR) 20 MG TABS Take 20 mg by mouth every evening.     • vitamin D (CHOLECALCIFEROL) 1000 UNIT TABS Take 2,000 Units by mouth every day.       No facility-administered encounter medications on file as of 6/19/2017.     Review of Systems   Constitutional: Negative for fever and chills.   HENT: Negative for sore throat.    Eyes: Negative for blurred vision.   Respiratory: Negative for cough and shortness of breath.    Cardiovascular: Negative for chest pain, palpitations, claudication, leg swelling and PND.   Gastrointestinal: Negative for nausea and abdominal pain.   Musculoskeletal: Negative for falls.   Skin: Negative for rash.   Neurological: Negative for dizziness, focal weakness, loss of consciousness, weakness and headaches.   Endo/Heme/Allergies: Does not bruise/bleed easily.        Objective:   /74 mmHg  Pulse 88  Ht 1.727 m (5' 7.99\")  Wt 77.565 kg (171 lb)  BMI 26.01 kg/m2  SpO2 96%    Physical Exam   Constitutional: No distress.   HENT:   Mouth/Throat: Oropharynx is clear and moist.   Eyes: No scleral icterus.   Cardiovascular: Normal rate, regular rhythm and intact distal " pulses.  Exam reveals no gallop and no friction rub.    No murmur heard.  Pulmonary/Chest: Effort normal and breath sounds normal. She has no rales.   Abdominal: Bowel sounds are normal. There is no tenderness.   Musculoskeletal: She exhibits no edema.   Neurological: She is alert.   Skin: No rash noted. She is not diaphoretic.   Psychiatric: She has a normal mood and affect.       Assessment:     1. Cardiac pacemaker in situ     2. Essential hypertension, benign     3. Hypertrophic obstructive cardiomyopathy (HOCM) (CMS-HCC)     4. Persistent atrial fibrillation (CMS-HCC)     5. Venous insufficiency of both lower extremities  REFERRAL TO VASCULAR SURGERY       Medical Decision Making:  Today's Assessment / Status / Plan:     It was my pleasure to meet with Ms. Hayward.    For her heart she's been doing well she does have features of significant varicosities in the legs with features of venous insufficiency she like to discuss further with vascular surgery for possible invasive approach to treatment she has used compression socks in the past but has been impaled to use this during the summer    I will see Ms. Hayward back in 6 months time and encouraged her to follow up with us over the phone or e-mail using my MyChart as issues arise.    It is my pleasure to participate in the care of Ms. Hayward.  Please do not hesitate to contact me with questions or concerns.    Dayo Nelson MD PhD FAC  Cardiologist [facility] for [facility]

## 2017-06-21 ENCOUNTER — TELEPHONE (OUTPATIENT)
Dept: CARDIOLOGY | Facility: MEDICAL CENTER | Age: 77
End: 2017-06-21

## 2017-06-21 NOTE — TELEPHONE ENCOUNTER
Patient is being referred to [facility].         If patient is not contacted in a timely manner, please contact [phone]       Thank You            Patient given phone number.

## 2017-06-22 DIAGNOSIS — I10 ESSENTIAL HYPERTENSION: ICD-10-CM

## 2017-06-22 RX ORDER — HYDROCHLOROTHIAZIDE 12.5 MG/1
CAPSULE, GELATIN COATED ORAL
Qty: 90 CAP | Refills: 3 | Status: SHIPPED | OUTPATIENT
Start: 2017-06-22 | End: 2018-05-24

## 2017-07-10 ENCOUNTER — TELEPHONE (OUTPATIENT)
Dept: CARDIOLOGY | Facility: MEDICAL CENTER | Age: 77
End: 2017-07-10

## 2017-07-10 DIAGNOSIS — H53.8 BLURRED VISION: ICD-10-CM

## 2017-07-10 DIAGNOSIS — H26.8 OTHER CATARACT, UNSPECIFIED LATERALITY: ICD-10-CM

## 2017-07-10 NOTE — TELEPHONE ENCOUNTER
----- Message from Jacqueline Lynch sent at 7/10/2017  9:36 AM PDT -----  Regarding: Referral to eye doctor   Contact: [phone]  LAWRENCE/Lida    Pt is calling to request a referral to eye doctor. States PCP office closed. Please call pt to advise at [phone].

## 2017-07-10 NOTE — TELEPHONE ENCOUNTER
Dayo Nelson M.D.  Lida Blackwood R.N.       Caller: Unspecified (Today, 10:40 AM)              sure       Order placed ion [facility] for referral to eye m.d., Dr. Lott and given to Getachew MULTANI

## 2017-07-12 DIAGNOSIS — H25.89 OTHER AGE-RELATED CATARACT OF BOTH EYES: ICD-10-CM

## 2017-07-13 ENCOUNTER — TELEPHONE (OUTPATIENT)
Dept: VASCULAR LAB | Facility: MEDICAL CENTER | Age: 77
End: 2017-07-13

## 2017-07-13 ENCOUNTER — ANTICOAGULATION VISIT (OUTPATIENT)
Dept: VASCULAR LAB | Facility: MEDICAL CENTER | Age: 77
End: 2017-07-13
Attending: INTERNAL MEDICINE
Payer: COMMERCIAL

## 2017-07-13 VITALS — DIASTOLIC BLOOD PRESSURE: 88 MMHG | SYSTOLIC BLOOD PRESSURE: 162 MMHG | HEART RATE: 70 BPM

## 2017-07-13 DIAGNOSIS — I48.19 PERSISTENT ATRIAL FIBRILLATION (HCC): ICD-10-CM

## 2017-07-13 LAB — INR PPP: 2.5 (ref 2–3.5)

## 2017-07-13 PROCEDURE — 85610 PROTHROMBIN TIME: CPT

## 2017-07-13 PROCEDURE — 99211 OFF/OP EST MAY X REQ PHY/QHP: CPT

## 2017-07-13 NOTE — PROGRESS NOTES
Anticoagulation Summary as of 7/13/2017     INR goal 2.0-3.0   Selected INR 2.5 (7/13/2017)   Maintenance plan 2.5 mg (5 mg x 0.5) on Mon, Fri; 5 mg (5 mg x 1) all other days   Weekly total 30 mg   Plan last modified Matt Walters, PHARMD (2/1/2017)   Next INR check 9/7/2017   Target end date Indefinite    Indications   Persistent atrial fibrillation (CMS-HCC) [I48.1]         Anticoagulation Episode Summary     INR check location     Preferred lab     Send INR reminders to     Comments       Anticoagulation Care Providers     Provider Role Specialty Phone number    Dayo Nelson M.D. Referring Cardiology [phone]    [facility] Responsible  [phone]        Anticoagulation Patient Findings   Negatives Missed Doses, Extra Doses, Medication Changes, Antibiotic Use, Diet Changes, Dental/Other Procedures, Hospitalization, Bleeding Gums, Nose Bleeds, Blood in Urine, Blood in Stool, Any Bruising, Other Complaints        Pt remains therapeutic today. Pt is to continue with current warfarin dosing regimen.  Pt denies any unusual s/s of bleeding, bruising, clotting or any changes to diet or medications.  Follow up in 8 weeks.      BP elevated - pt states BP at home runse 120s/70s  Odette Cameron, SOUMYAD

## 2017-07-13 NOTE — MR AVS SNAPSHOT
Rebekah Hayward   2017 9:45 AM   Anticoagulation Visit   MRN: [id number]    Department:  Vascular Medicine   Dept Phone:  [phone]    Description:  Female : 1940   Provider:  [facility] EXAM 4           Allergies as of 2017     Allergen Noted Reactions    Percocet [Oxycodone-Acetaminophen] 2014         You were diagnosed with     Persistent atrial fibrillation (CMS-[facility])   [531769]         Vital Signs     Smoking Status                   Never Smoker            Basic Information     Date Of Birth Sex Race Ethnicity Preferred Language    1940 Female White Non- English      Your appointments     2017  9:45 AM   Established Patient with [facility] EXAM 4   [facility]  (--)    [address]  [place] [address]   [phone]            Sep 07, 2017  7:30 AM   Established Patient with [facility] EXAM 5   [facility]  (--)    [address]  [place] [address]   [phone]            Dec 06, 2017  2:45 PM   PACER CHECK ONLY with PACER CHECK-[facility] B   [facility] (--)    [address]  [place] [address]   [phone]            Dec 06, 2017  3:00 PM   FOLLOW UP with Dayo Nelson M.D.   [facility] (--)    [address], [address]  [place] [address]   [phone]              Problem List              ICD-10-CM Priority Class Noted - Resolved    Hypercholesterolemia E78.00 High  Unknown - Present    Essential hypertension, benign I10 High  Unknown - Present    Hypertrophic obstructive cardiomyopathy (HOCM) (CMS-[facility]) I42.1 High  Unknown - Present    History of orthostatic hypotension Z86.79 High  Unknown - Present    Palpitations R00.2 High  Unknown - Present    Sinus bradycardia R00.1 High  Unknown - Present    History of digestive disorder Z87.19 High  Unknown - Present    HH (hiatus hernia) K44.9 High  Unknown - Present    Cardiac pacemaker in situ Z95.0 High  5/8/2012 - Present    Other and unspecified hyperlipidemia E78.5   9/30/2014 - Present    Persistent atrial fibrillation (CMS-HCC) I48.1 High  1/21/2015 - Present    Dyslipidemia E78.5   1/20/2016 - Present    Venous insufficiency of both lower extremities (Chronic) I87.2   Unknown - Present      Health Maintenance        Date Due Completion Dates    IMM DTaP/Tdap/Td Vaccine (1 - Tdap) 9/12/1961 ---    PAP SMEAR 9/12/1963 ---    MAMMOGRAM 9/12/1982 ---    COLONOSCOPY 9/12/1992 ---    IMM ZOSTER VACCINE 9/12/2002 ---    BONE DENSITY 9/12/2007 ---    IMM PNEUMOCOCCAL 65+ (ADULT) LOW/MEDIUM RISK SERIES (1 of 2 - PCV13) 9/12/2007 ---    IMM INFLUENZA (1) 9/1/2017 ---            Results     POCT Protime      Component    INR    2.5                        Current Immunizations     No immunizations on file.      Below and/or attached are the medications your provider expects you to take. Review all of your home medications and newly ordered medications with your provider and/or pharmacist. Follow medication instructions as directed by your provider and/or pharmacist. Please keep your medication list with you and share with your provider. Update the information when medications are discontinued, doses are changed, or new medications (including over-the-counter products) are added; and carry medication information at all times in the event of emergency situations     Allergies:  PERCOCET - (reactions not documented)               Medications  Valid as of: July 13, 2017 -  9:01 AM    Generic Name Brand Name Tablet Size Instructions for use    Calcium-Magnesium-Vitamin D   Take 500 mg by mouth every day.        Carvedilol (Tab) COREG 25 MG Take 1 Tab by mouth 2 times a day, with meals.        Cholecalciferol (Tab) cholecalciferol 1000 UNIT Take 2,000 Units by mouth every day.        DilTIAZem HCl Coated Beads (CAPSULE SR 24 HR) CARDIZEM   MG Take 1 Cap by mouth every day.        HydroCHLOROthiazide (Cap) MICROZIDE 12.5 MG TAKE 1 CAPSULE BY MOUTH EVERY DAY        Multiple Minerals-Vitamins (Tab) CITRACAL PLUS  Take 1 Tab by mouth every day.        Simvastatin (Tab) ZOCOR 20 MG Take 20 mg by mouth every evening.        Triamcinolone Acetonide (Cream) KENALOG 0.1 % Apply  to affected area(s) every day.        Warfarin Sodium (Tab) COUMADIN 5 MG Take 1 Tab by mouth every day.        Warfarin Sodium (Tab) COUMADIN 5 MG Take one-half to one tablet by mouth one time daily as directed by coumadin clinic        .                 Medicines prescribed today were sent to:     [id number] - [place], [place] - [address] [address]    Phone: [phone] Fax: [phone]    Open 24 Hours?: No      Medication refill instructions:       If your prescription bottle indicates you have medication refills left, it is not necessary to call your provider’s office. Please contact your pharmacy and they will refill your medication.    If your prescription bottle indicates you do not have any refills left, you may request refills at any time through one of the following ways: The online [employer] system (except Urgent Care), by calling your provider’s office, or by asking your pharmacy to contact your provider’s office with a refill request. Medication refills are processed only during regular business hours and may not be available until the next business day. Your provider may request additional information or to have a follow-up visit with you prior to refilling your medication.   *Please Note: Medication refills are assigned a new Rx number when refilled electronically. Your pharmacy may indicate that no refills were authorized even though a new prescription for the same medication is available at the pharmacy. Please request the medicine by name with the pharmacy before contacting your provider for a refill.           Warfarin Dosing Calendar   July 2017 Details    Sun Mon Tue Wed Thu Fri Sat           1                 2               3               4               5               6               7               8                 9               10               11               12               13   2.5   5 mg   See details      14      2.5 mg         15      5 mg           16      5 mg         17      2.5 mg         18      5 mg         19      5 mg         20      5 mg         21      2.5 mg         22      5 mg           23      5 mg         24      2.5 mg         25      5 mg         26      5 mg         27      5 mg         28      2.5 mg         29      5 mg           30      5 mg         31      2.5 mg               Date Details   07/13 This INR check   INR: 2.5               How to take your warfarin dose     To take:  2.5 mg Take 0.5 of a 5 mg tablet.    To take:  5 mg Take 1 of the 5 mg tablets.           Warfarin Dosing Calendar   August 2017 Details    Sun Mon Tue Wed Thu Fri Sat       1      5 mg         2      5 mg         3      5 mg         4      2.5 mg         5      5 mg           6      5 mg         7      2.5 mg         8      5 mg         9      5 mg         10      5 mg         11      2.5 mg         12      5 mg           13      5 mg         14      2.5 mg         15      5 mg         16      5 mg         17      5 mg         18      2.5 mg         19      5 mg           20      5 mg         21      2.5 mg         22      5 mg         23      5 mg         24      5 mg         25      2.5 mg         26      5 mg           27      5 mg         28      2.5 mg         29      5 mg         30      5 mg         31      5 mg            Date Details   No additional details            How to take your warfarin dose     To take:  2.5 mg Take 0.5 of a 5 mg tablet.    To take:  5 mg Take 1 of the 5 mg tablets.           Warfarin Dosing Calendar   September 2017 Details    Sun Mon Tue Wed Thu Fri Sat          1       2.5 mg         2      5 mg           3      5 mg         4      2.5 mg         5      5 mg         6      5 mg         7      5 mg         8               9                 10               11               12               13               14               15               16                 17               18               19               20               21               22               23                 24               25               26               27               28               29               30                Date Details   No additional details    Date of next INR:  9/7/2017         How to take your warfarin dose     To take:  2.5 mg Take 0.5 of a 5 mg tablet.    To take:  5 mg Take 1 of the 5 mg tablets.              MyChart Status: Patient Declined

## 2017-07-13 NOTE — TELEPHONE ENCOUNTER
Getachew Olivier       Sent: Thu July 13, 2017  4:08 PM       To: Lida Blackwood R.N.              Message        The referral to ophthalmology will be sent to the offices of [facility].       If the patient is not contacted in a timely manner, they should call [phone]      Pt notified.

## 2017-07-13 NOTE — TELEPHONE ENCOUNTER
Prior Authorization # [id number] has been submitted to [facility] so this patient may be regularly seen in our office.

## 2017-07-24 PROBLEM — L30.9 DERMATITIS, UNSPECIFIED: Status: ACTIVE | Noted: 2017-07-24

## 2017-08-31 DIAGNOSIS — I48.19 PERSISTENT ATRIAL FIBRILLATION (HCC): ICD-10-CM

## 2017-08-31 DIAGNOSIS — I10 ESSENTIAL HYPERTENSION, BENIGN: ICD-10-CM

## 2017-09-01 RX ORDER — DILTIAZEM HYDROCHLORIDE 240 MG/1
CAPSULE, EXTENDED RELEASE ORAL
Qty: 90 CAP | Refills: 3 | Status: SHIPPED | OUTPATIENT
Start: 2017-09-01

## 2017-09-07 ENCOUNTER — ANTICOAGULATION VISIT (OUTPATIENT)
Dept: VASCULAR LAB | Facility: MEDICAL CENTER | Age: 77
End: 2017-09-07
Attending: INTERNAL MEDICINE
Payer: COMMERCIAL

## 2017-09-07 VITALS — SYSTOLIC BLOOD PRESSURE: 158 MMHG | DIASTOLIC BLOOD PRESSURE: 94 MMHG | HEART RATE: 79 BPM

## 2017-09-07 DIAGNOSIS — I48.19 PERSISTENT ATRIAL FIBRILLATION (HCC): ICD-10-CM

## 2017-09-07 LAB — INR PPP: 1.9 (ref 2–3.5)

## 2017-09-07 PROCEDURE — 99212 OFFICE O/P EST SF 10 MIN: CPT

## 2017-09-07 PROCEDURE — 85610 PROTHROMBIN TIME: CPT

## 2017-09-07 NOTE — PROGRESS NOTES
Anticoagulation Summary  As of 9/7/2017    INR goal:   2.0-3.0   TTR:   76.1 % (1.6 y)   Today's INR:   1.9!   Maintenance plan:   2.5 mg (5 mg x 0.5) on Mon, Fri; 5 mg (5 mg x 1) all other days   Weekly total:   30 mg   Plan last modified:   Matt Walters PharmD (2/1/2017)   Next INR check:   10/5/2017   Target end date:   Indefinite    Indications    Persistent atrial fibrillation (CMS-HCC) [I48.1]             Anticoagulation Episode Summary     INR check location:       Preferred lab:       Send INR reminders to:       Comments:         Anticoagulation Care Providers     Provider Role Specialty Phone number    Dayo Nelson M.D. Referring Cardiology [phone]    [facility] Responsible  [phone]        Anticoagulation Patient Findings      Rebekah Hayward seen in clinic today  INR  sub-therapeutic.    Denies signs/symptoms of bleeding and/or thrombosis.    Denies changes to diet or medications.   Follow up appointment in 4 week(s).    5mg today then continue weekly warfarin dose as noted     Ovi Pabon, PharmD

## 2017-09-08 LAB — INR BLD: 1.9 (ref 0.9–1.2)

## 2017-09-18 DIAGNOSIS — I48.19 PERSISTENT ATRIAL FIBRILLATION (HCC): ICD-10-CM

## 2017-09-18 RX ORDER — WARFARIN SODIUM 5 MG/1
TABLET ORAL
Qty: 90 TAB | Refills: 1 | Status: SHIPPED | OUTPATIENT
Start: 2017-09-18 | End: 2018-03-28 | Stop reason: SDUPTHER

## 2017-10-05 ENCOUNTER — ANTICOAGULATION VISIT (OUTPATIENT)
Dept: VASCULAR LAB | Facility: MEDICAL CENTER | Age: 77
End: 2017-10-05
Attending: INTERNAL MEDICINE
Payer: COMMERCIAL

## 2017-10-05 VITALS — HEART RATE: 71 BPM | SYSTOLIC BLOOD PRESSURE: 161 MMHG | DIASTOLIC BLOOD PRESSURE: 105 MMHG

## 2017-10-05 DIAGNOSIS — I48.19 PERSISTENT ATRIAL FIBRILLATION (HCC): ICD-10-CM

## 2017-10-05 LAB
INR BLD: 2 (ref 0.9–1.2)
INR PPP: 2 (ref 2–3.5)

## 2017-10-05 PROCEDURE — 99211 OFF/OP EST MAY X REQ PHY/QHP: CPT

## 2017-10-05 PROCEDURE — 85610 PROTHROMBIN TIME: CPT

## 2017-10-05 NOTE — PROGRESS NOTES
Anticoagulation Summary  As of 10/5/2017    INR goal:   2.0-3.0   TTR:   72.6 % (1.7 y)   Today's INR:   2.0   Maintenance plan:   2.5 mg (5 mg x 0.5) on Mon, Fri; 5 mg (5 mg x 1) all other days   Weekly total:   30 mg   Plan last modified:   Rosita DoshiD (2/1/2017)   Next INR check:   11/16/2017   Target end date:   Indefinite    Indications    Persistent atrial fibrillation (CMS-HCC) [I48.1]             Anticoagulation Episode Summary     INR check location:       Preferred lab:       Send INR reminders to:       Comments:         Anticoagulation Care Providers     Provider Role Specialty Phone number    Dayo Nelson M.D. Referring Cardiology [phone]    [facility] Responsible  [phone]        History of Present Illness: follow up appointment for chronic anticoagulation for persistent atrial fibrillation.      Pt remains therapeutic today. Pt is to continue with current warfarin dosing regimen.  Pt denies any unusual s/s of bleeding, bruising, clotting or any changes to diet or medications.  Follow up in 6 weeks.    Odette Cameron, PharmD

## 2017-10-23 ENCOUNTER — APPOINTMENT (RX ONLY)
Dept: URBAN - METROPOLITAN AREA CLINIC 36 | Facility: CLINIC | Age: 77
Setting detail: DERMATOLOGY
End: 2017-10-23

## 2017-10-23 DIAGNOSIS — L90.5 SCAR CONDITIONS AND FIBROSIS OF SKIN: ICD-10-CM

## 2017-10-23 PROBLEM — Z85.828 PERSONAL HISTORY OF OTHER MALIGNANT NEOPLASM OF SKIN: Status: ACTIVE | Noted: 2017-10-23

## 2017-10-23 PROBLEM — I48.91 UNSPECIFIED ATRIAL FIBRILLATION: Status: ACTIVE | Noted: 2017-10-23

## 2017-10-23 PROBLEM — I10 ESSENTIAL (PRIMARY) HYPERTENSION: Status: ACTIVE | Noted: 2017-10-23

## 2017-10-23 PROBLEM — E78.5 HYPERLIPIDEMIA, UNSPECIFIED: Status: ACTIVE | Noted: 2017-10-23

## 2017-10-23 PROCEDURE — ? COUNSELING

## 2017-10-23 PROCEDURE — 99024 POSTOP FOLLOW-UP VISIT: CPT

## 2017-10-23 ASSESSMENT — LOCATION ZONE DERM: LOCATION ZONE: LIP

## 2017-10-23 ASSESSMENT — LOCATION SIMPLE DESCRIPTION DERM: LOCATION SIMPLE: LEFT LIP

## 2017-10-23 ASSESSMENT — LOCATION DETAILED DESCRIPTION DERM: LOCATION DETAILED: LEFT INFERIOR VERMILION LIP

## 2017-11-16 ENCOUNTER — ANTICOAGULATION VISIT (OUTPATIENT)
Dept: VASCULAR LAB | Facility: MEDICAL CENTER | Age: 77
End: 2017-11-16
Attending: INTERNAL MEDICINE
Payer: COMMERCIAL

## 2017-11-16 VITALS — SYSTOLIC BLOOD PRESSURE: 149 MMHG | DIASTOLIC BLOOD PRESSURE: 90 MMHG | HEART RATE: 70 BPM

## 2017-11-16 DIAGNOSIS — I48.19 PERSISTENT ATRIAL FIBRILLATION (HCC): ICD-10-CM

## 2017-11-16 LAB
INR BLD: 1.9 (ref 0.9–1.2)
INR PPP: 1.9 (ref 2–3.5)

## 2017-11-16 PROCEDURE — 85610 PROTHROMBIN TIME: CPT

## 2017-11-16 PROCEDURE — 99212 OFFICE O/P EST SF 10 MIN: CPT

## 2017-11-16 NOTE — PROGRESS NOTES
Anticoagulation Summary  As of 11/16/2017    INR goal:   2.0-3.0   TTR:   68.0 % (1.8 y)   Today's INR:   1.9!   Maintenance plan:   2.5 mg (5 mg x 0.5) on Mon; 5 mg (5 mg x 1) all other days   Weekly total:   32.5 mg   Plan last modified:   Odette Cameron PharmD (11/16/2017)   Next INR check:   12/6/2017   Target end date:   Indefinite    Indications    Persistent atrial fibrillation (CMS-HCC) [I48.1]             Anticoagulation Episode Summary     INR check location:       Preferred lab:       Send INR reminders to:       Comments:         Anticoagulation Care Providers     Provider Role Specialty Phone number    Dayo Nelson M.D. Referring Cardiology [phone]    [facility] Responsible  [phone]        Anticoagulation Patient Findings  Patient Findings     Negatives:   Signs/symptoms of thrombosis, Signs/symptoms of bleeding, Laboratory test error suspected, Change in health, Change in alcohol use, Change in activity, Upcoming invasive procedure, Emergency department visit, Upcoming dental procedure, Missed doses, Extra doses, Change in medications, Change in diet/appetite, Hospital admission, Bruising, Other complaints        History of Present Illness: follow up appointment for chronic anticoagulation with the high risk medication, warfarin for atrial fibrillation    Pt is sub therapeutic today, and has been low/normal.  Will bolus dose with 7.5mg tonight and increase weekly dose by 8%. Pt denies any unusual s/s of bleeding, bruising, clotting or any changes to diet or medications.  Follow up in 2 weeks, to reduce risk of adverse events related to this high risk medication,  Warfarin.    Odette Cameron, RositaD

## 2017-12-06 ENCOUNTER — NON-PROVIDER VISIT (OUTPATIENT)
Dept: CARDIOLOGY | Facility: MEDICAL CENTER | Age: 77
End: 2017-12-06
Payer: COMMERCIAL

## 2017-12-06 ENCOUNTER — OFFICE VISIT (OUTPATIENT)
Dept: CARDIOLOGY | Facility: MEDICAL CENTER | Age: 77
End: 2017-12-06
Payer: COMMERCIAL

## 2017-12-06 ENCOUNTER — ANTICOAGULATION VISIT (OUTPATIENT)
Dept: VASCULAR LAB | Facility: MEDICAL CENTER | Age: 77
End: 2017-12-06
Attending: INTERNAL MEDICINE
Payer: COMMERCIAL

## 2017-12-06 VITALS
HEIGHT: 68 IN | WEIGHT: 166 LBS | DIASTOLIC BLOOD PRESSURE: 90 MMHG | OXYGEN SATURATION: 95 % | HEART RATE: 74 BPM | SYSTOLIC BLOOD PRESSURE: 148 MMHG | BODY MASS INDEX: 25.16 KG/M2

## 2017-12-06 VITALS — HEART RATE: 66 BPM | DIASTOLIC BLOOD PRESSURE: 88 MMHG | SYSTOLIC BLOOD PRESSURE: 142 MMHG

## 2017-12-06 DIAGNOSIS — I87.2 VENOUS INSUFFICIENCY OF BOTH LOWER EXTREMITIES: Chronic | ICD-10-CM

## 2017-12-06 DIAGNOSIS — Z86.79 HISTORY OF ORTHOSTATIC HYPOTENSION: ICD-10-CM

## 2017-12-06 DIAGNOSIS — Z95.0 CARDIAC PACEMAKER IN SITU: ICD-10-CM

## 2017-12-06 DIAGNOSIS — E78.00 HYPERCHOLESTEROLEMIA: ICD-10-CM

## 2017-12-06 DIAGNOSIS — I48.19 PERSISTENT ATRIAL FIBRILLATION (HCC): ICD-10-CM

## 2017-12-06 DIAGNOSIS — E78.5 DYSLIPIDEMIA: ICD-10-CM

## 2017-12-06 DIAGNOSIS — I10 ESSENTIAL HYPERTENSION, BENIGN: ICD-10-CM

## 2017-12-06 DIAGNOSIS — I42.1 HYPERTROPHIC OBSTRUCTIVE CARDIOMYOPATHY (HOCM) (HCC): ICD-10-CM

## 2017-12-06 LAB — INR PPP: 2.2 (ref 2–3.5)

## 2017-12-06 PROCEDURE — 93279 PRGRMG DEV EVAL PM/LDLS PM: CPT | Performed by: INTERNAL MEDICINE

## 2017-12-06 PROCEDURE — 99211 OFF/OP EST MAY X REQ PHY/QHP: CPT | Performed by: NURSE PRACTITIONER

## 2017-12-06 PROCEDURE — 85610 PROTHROMBIN TIME: CPT

## 2017-12-06 PROCEDURE — 99214 OFFICE O/P EST MOD 30 MIN: CPT | Performed by: INTERNAL MEDICINE

## 2017-12-06 RX ORDER — UREA 10 %
LOTION (ML) TOPICAL
COMMUNITY

## 2017-12-06 RX ORDER — SIMVASTATIN 20 MG
20 TABLET ORAL EVERY EVENING
Qty: 90 TAB | Refills: 3 | Status: SHIPPED | OUTPATIENT
Start: 2017-12-06 | End: 2018-05-24

## 2017-12-06 NOTE — LETTER
[facility]   [address]  [place] [address]  Phone: [phone]  Fax: [phone]              Rebekah Hayward  1940    Encounter Date: 12/6/2017    Dayo Nelson M.D.          PROGRESS NOTE:  Subjective:   Rebekah Hayward is a 77 y.o. female who presents today  For follow-up of her history of atrial fibrillation with pacemaker and hypertrophic cardiomyopathy    She found out today likely we are out of network for [employer] and so she will have to establish with a cardiologist in network    She's been doing quite well for her prior cardiac history no new swelling angina or shortness of breath    She followed up with Gastroenterology is likely to have colonoscopy next year    Past Medical History:   Diagnosis Date   • A-fib (CMS-HCC) 1/21/2015   • Atrial fibrillation (CMS-HCC)    • Atrial fibrillation (CMS-HCC)    • Cardiac pacemaker in situ 5/8/2012 July 2011:. [employer] Altrua 60 [id number] implanted by Dr. Jose Sierra.    • HH (hiatus hernia) 08/2007    History of Paraesophageal Hiatus Hernia Repair.   • History of digestive disorder 06/13/2007    Common duct dilation, status post ERCP and stent placement.   • History of orthostatic hypotension    • HTN (hypertension)    • Hypercholesterolemia    • Hypertension    • Hypertrophic obstructive cardiomyopathy(425.11)    • Murmur    • Orthostatic hypotension    • DAVID (obstructive sleep apnea)    • Palpitations    • Sinus bradycardia    • Venous insufficiency of both lower extremities      Past Surgical History:   Procedure Laterality Date   • OTHER ORTHOPEDIC SURGERY  January 2013    Left elbow, by Dr. De Paz   • PACEMAKER INSERTION  July 2011    [employer] Altrua 60 [id number] implanted by Dr. Jose Sierra.   • CHOLECYSTECTOMY  12/2006    Laparscopic   • KNEE REPLACEMENT, TOTAL  01/2006    Left, by Dr. De Paz   • APPENDECTOMY     • HEMORRHOIDECTOMY     • HYSTERECTOMY RADICAL        Family History   Problem Relation Age of Onset   • Heart Disease Father      Cardiac defibriliator, & CABG   • Heart Attack Father      Acute MI     History   Smoking Status   • Never Smoker   Smokeless Tobacco   • Never Used     Allergies   Allergen Reactions   • Percocet [Oxycodone-Acetaminophen]      Outpatient Encounter Prescriptions as of 12/6/2017   Medication Sig Dispense Refill   • folic acid (FOLVITE) 800 MCG tablet Take  by mouth.     • simvastatin (ZOCOR) 20 MG Tab Take 1 Tab by mouth every evening. 90 Tab 3   • warfarin (COUMADIN) 5 MG Tab Take one-half to one tablet by mouth one time daily as directed by coumadin clinic 90 Tab 1   • CARTIA  MG CAPSULE SR 24 HR TAKE 1 CAPUSLE BY MOUTH EVERY DAY 90 Cap 3   • hydrochlorothiazide (MICROZIDE) 12.5 MG capsule TAKE 1 CAPSULE BY MOUTH EVERY DAY 90 Cap 3   • carvedilol (COREG) 25 MG Tab Take 1 Tab by mouth 2 times a day, with meals. 180 Tab 3   • triamcinolone acetonide (KENALOG) 0.1 % CREA Apply  to affected area(s) every day.     • Calcium Carbonate (CALCIUM 500 PO) Take 500 mg by mouth every day.     • Multiple Minerals-Vitamins (CITRACAL PLUS) TABS Take 1 Tab by mouth every day.     • vitamin D (CHOLECALCIFEROL) 1000 UNIT TABS Take 2,000 Units by mouth every day.     • [DISCONTINUED] simvastatin (ZOCOR) 20 MG TABS Take 20 mg by mouth every evening.       No facility-administered encounter medications on file as of 12/6/2017.      Review of Systems   Constitutional: Negative for chills and fever.   HENT: Negative for sore throat.    Eyes: Negative for blurred vision.   Respiratory: Negative for cough and shortness of breath.    Cardiovascular: Negative for chest pain, palpitations, claudication, leg swelling and PND.   Gastrointestinal: Negative for abdominal pain and nausea.   Musculoskeletal: Negative for falls and joint pain.   Skin: Negative for rash.   Neurological: Negative for dizziness, focal weakness and weakness.   Endo/Heme/Allergies:  "Does not bruise/bleed easily.        Objective:   /90   Pulse 74   Ht 1.727 m (5' 7.99\")   Wt 75.3 kg (166 lb)   SpO2 95%   BMI 25.25 kg/m²      Physical Exam   Constitutional: No distress.   HENT:   Mouth/Throat: Oropharynx is clear and moist.   Eyes: No scleral icterus.   Cardiovascular: Normal rate, regular rhythm and intact distal pulses.  Exam reveals no gallop and no friction rub.    No murmur heard.  Pulmonary/Chest: Effort normal and breath sounds normal. She has no rales.   Abdominal: Bowel sounds are normal. There is no tenderness.   Musculoskeletal: She exhibits no edema.   Neurological: She is alert.   Skin: No rash noted. She is not diaphoretic.   Psychiatric: She has a normal mood and affect.     Pacemaker check finds it is functioning appropriately she has atrial fibrillation permanently    Labs from [facility] reviewed from January she has normal kidney function normal A1c normal CBC and lipids at goal HDL 40 LDL 70    Last echocardiogram was from [facility] at [facility] showed moderate septal hypertrophy without features of obstruction moderate tricuspid regurgitation with estimated pulmonary pressure of 45     Last nuclear stress test 2011 showed possible mild defect in the anterior wall felt to artifact from breast    Assessment:     1. Hypercholesterolemia     2. Essential hypertension, benign     3. Hypertrophic obstructive cardiomyopathy (HOCM) (CMS-HCC)     4. History of orthostatic hypotension     5. Cardiac pacemaker in situ     6. Venous insufficiency of both lower extremities     7. Dyslipidemia         Medical Decision Making:  Today's Assessment / Status / Plan:     It was my pleasure to meet with Ms. Hayward.    She is likely to switch to in network cardiologist her pacemaker is functioning appropriately she is on proper anticoagulation for long-term    Pressure is well controlled typically at home    She is on Coumadin for anticoagulation, She can safely hold this " short term as necessary for elective procedures like a colonoscopy    We would block to see her for her heart health if her insurance allows    It is my pleasure to participate in the care of Ms. Hayward.  Please do not hesitate to contact me with questions or concerns.    Dayo Nelson MD PhD [facility]  Cardiologist [facility]        Maggy Vargas, N.P.  [address]  [place] [address]  VIA Facsimile: [phone]     Tiffanie Massey M.D.  [address] [address]  VIA Facsimile: [phone]

## 2017-12-06 NOTE — PROGRESS NOTES
Anticoagulation Summary  As of 12/6/2017    INR goal:   2.0-3.0   TTR:   67.9 % (1.9 y)   Today's INR:   2.2   Maintenance plan:   2.5 mg (5 mg x 0.5) on Mon; 5 mg (5 mg x 1) all other days   Weekly total:   32.5 mg   Plan last modified:   Odette Cameron, PharmD (11/16/2017)   Next INR check:   1/4/2018   Target end date:   Indefinite    Indications    Persistent atrial fibrillation (CMS-HCC) [I48.1]             Anticoagulation Episode Summary     INR check location:       Preferred lab:       Send INR reminders to:       Comments:         Anticoagulation Care Providers     Provider Role Specialty Phone number    Dayo Nelson M.D. Referring Cardiology [phone]    [facility] Responsible  [phone]        Anticoagulation Patient Findings      HPI:  Rebekah Hayward seen in clinic today for follow up on anticoagulation therapy in the presence of AF. Denies any changes to current medical/health status since last appointment. Denies any medication or diet changes. No current symptoms of bleeding or thrombosis reported.    A/P:   INR therapeutic. Continue current regimen. BP recorded in vitals.    Follow up appointment in 4 week(s).    Next Appointment: Thursday, January 4, 2018 at 7:30 am.    Tiffany ZAZUETA

## 2017-12-07 ENCOUNTER — TELEPHONE (OUTPATIENT)
Dept: CARDIOLOGY | Facility: MEDICAL CENTER | Age: 77
End: 2017-12-07

## 2017-12-07 DIAGNOSIS — Z95.0 CARDIAC PACEMAKER IN SITU: ICD-10-CM

## 2017-12-07 DIAGNOSIS — I42.1 HYPERTROPHIC OBSTRUCTIVE CARDIOMYOPATHY (HOCM) (HCC): ICD-10-CM

## 2017-12-07 DIAGNOSIS — I48.19 PERSISTENT ATRIAL FIBRILLATION (HCC): ICD-10-CM

## 2017-12-07 LAB — INR BLD: 2.2 (ref 0.9–1.2)

## 2017-12-07 ASSESSMENT — ENCOUNTER SYMPTOMS
WEAKNESS: 0
COUGH: 0
SORE THROAT: 0
PALPITATIONS: 0
FEVER: 0
DIZZINESS: 0
PND: 0
NAUSEA: 0
CHILLS: 0
FOCAL WEAKNESS: 0
FALLS: 0
CLAUDICATION: 0
ABDOMINAL PAIN: 0
BLURRED VISION: 0
BRUISES/BLEEDS EASILY: 0
SHORTNESS OF BREATH: 0

## 2017-12-07 NOTE — TELEPHONE ENCOUNTER
----- Message from India Snowden sent at 12/7/2017  8:16 AM PST -----  Regarding: referral request  Contact: [phone]  CW/patrick    Pt calling for referral to another cardiologist due to having [employer].  Please call pt at

## 2017-12-07 NOTE — TELEPHONE ENCOUNTER
"Ha Paige - Referral to Cardiology << Less Detail',event)\" href=\"javascript:;\">More Detail >>      Referral to Cardiology   Ha Paige   Sent: Thu December 07, 2017  3:15 PM   To: Brittany Phillips R.N.                  Message     The referral has been faxed to [facility] [phone]       ==========================================================================    Noted.  "

## 2017-12-07 NOTE — PROGRESS NOTES
Subjective:   Rebekah Hayward is a 77 y.o. female who presents today  For follow-up of her history of atrial fibrillation with pacemaker and hypertrophic cardiomyopathy    She found out today likely we are out of network for [employer] and so she will have to establish with a cardiologist in network    She's been doing quite well for her prior cardiac history no new swelling angina or shortness of breath    She followed up with Gastroenterology is likely to have colonoscopy next year    Past Medical History:   Diagnosis Date   • A-fib (CMS-HCC) 1/21/2015   • Atrial fibrillation (CMS-HCC)    • Atrial fibrillation (CMS-HCC)    • Cardiac pacemaker in situ 5/8/2012 July 2011:. [employer] Altrua 60 [id number] implanted by Dr. Jose Sierra.    • HH (hiatus hernia) 08/2007    History of Paraesophageal Hiatus Hernia Repair.   • History of digestive disorder 06/13/2007    Common duct dilation, status post ERCP and stent placement.   • History of orthostatic hypotension    • HTN (hypertension)    • Hypercholesterolemia    • Hypertension    • Hypertrophic obstructive cardiomyopathy(425.11)    • Murmur    • Orthostatic hypotension    • DAVID (obstructive sleep apnea)    • Palpitations    • Sinus bradycardia    • Venous insufficiency of both lower extremities      Past Surgical History:   Procedure Laterality Date   • OTHER ORTHOPEDIC SURGERY  January 2013    Left elbow, by Dr. De Paz   • PACEMAKER INSERTION  July 2011    [employer] Altrua 60 [id number] implanted by Dr. Jose Sierra.   • CHOLECYSTECTOMY  12/2006    Laparscopic   • KNEE REPLACEMENT, TOTAL  01/2006    Left, by Dr. De Paz   • APPENDECTOMY     • HEMORRHOIDECTOMY     • HYSTERECTOMY RADICAL       Family History   Problem Relation Age of Onset   • Heart Disease Father      Cardiac defibriliator, & CABG   • Heart Attack Father      Acute MI     History   Smoking Status   • Never Smoker   Smokeless Tobacco   • Never Used     Allergies   Allergen Reactions   •  "Percocet [Oxycodone-Acetaminophen]      Outpatient Encounter Prescriptions as of 12/6/2017   Medication Sig Dispense Refill   • folic acid (FOLVITE) 800 MCG tablet Take  by mouth.     • simvastatin (ZOCOR) 20 MG Tab Take 1 Tab by mouth every evening. 90 Tab 3   • warfarin (COUMADIN) 5 MG Tab Take one-half to one tablet by mouth one time daily as directed by coumadin clinic 90 Tab 1   • CARTIA  MG CAPSULE SR 24 HR TAKE 1 CAPUSLE BY MOUTH EVERY DAY 90 Cap 3   • hydrochlorothiazide (MICROZIDE) 12.5 MG capsule TAKE 1 CAPSULE BY MOUTH EVERY DAY 90 Cap 3   • carvedilol (COREG) 25 MG Tab Take 1 Tab by mouth 2 times a day, with meals. 180 Tab 3   • triamcinolone acetonide (KENALOG) 0.1 % CREA Apply  to affected area(s) every day.     • Calcium Carbonate (CALCIUM 500 PO) Take 500 mg by mouth every day.     • Multiple Minerals-Vitamins (CITRACAL PLUS) TABS Take 1 Tab by mouth every day.     • vitamin D (CHOLECALCIFEROL) 1000 UNIT TABS Take 2,000 Units by mouth every day.     • [DISCONTINUED] simvastatin (ZOCOR) 20 MG TABS Take 20 mg by mouth every evening.       No facility-administered encounter medications on file as of 12/6/2017.      Review of Systems   Constitutional: Negative for chills and fever.   HENT: Negative for sore throat.    Eyes: Negative for blurred vision.   Respiratory: Negative for cough and shortness of breath.    Cardiovascular: Negative for chest pain, palpitations, claudication, leg swelling and PND.   Gastrointestinal: Negative for abdominal pain and nausea.   Musculoskeletal: Negative for falls and joint pain.   Skin: Negative for rash.   Neurological: Negative for dizziness, focal weakness and weakness.   Endo/Heme/Allergies: Does not bruise/bleed easily.        Objective:   /90   Pulse 74   Ht 1.727 m (5' 7.99\")   Wt 75.3 kg (166 lb)   SpO2 95%   BMI 25.25 kg/m²     Physical Exam   Constitutional: No distress.   HENT:   Mouth/Throat: Oropharynx is clear and moist.   Eyes: No " scleral icterus.   Cardiovascular: Normal rate, regular rhythm and intact distal pulses.  Exam reveals no gallop and no friction rub.    No murmur heard.  Pulmonary/Chest: Effort normal and breath sounds normal. She has no rales.   Abdominal: Bowel sounds are normal. There is no tenderness.   Musculoskeletal: She exhibits no edema.   Neurological: She is alert.   Skin: No rash noted. She is not diaphoretic.   Psychiatric: She has a normal mood and affect.     Pacemaker check finds it is functioning appropriately she has atrial fibrillation permanently    Labs from [facility] reviewed from January she has normal kidney function normal A1c normal CBC and lipids at goal HDL 40 LDL 70    Last echocardiogram was from 2014 at [facility] showed moderate septal hypertrophy without features of obstruction moderate tricuspid regurgitation with estimated pulmonary pressure of 45     Last nuclear stress test 2011 showed possible mild defect in the anterior wall felt to artifact from breast    Assessment:     1. Hypercholesterolemia     2. Essential hypertension, benign     3. Hypertrophic obstructive cardiomyopathy (HOCM) (CMS-HCC)     4. History of orthostatic hypotension     5. Cardiac pacemaker in situ     6. Venous insufficiency of both lower extremities     7. Dyslipidemia         Medical Decision Making:  Today's Assessment / Status / Plan:     It was my pleasure to meet with Ms. Hayward.    She is likely to switch to in network cardiologist her pacemaker is functioning appropriately she is on proper anticoagulation for long-term    Pressure is well controlled typically at home    She is on Coumadin for anticoagulation, She can safely hold this short term as necessary for elective procedures like a colonoscopy    We would block to see her for her heart health if her insurance allows    It is my pleasure to participate in the care of Ms. Hayward.  Please do not hesitate to contact me with questions or  concerns.    Dayo Nelson MD PhD [facility]  Cardiologist [facility]

## 2018-01-04 ENCOUNTER — ANTICOAGULATION VISIT (OUTPATIENT)
Dept: VASCULAR LAB | Facility: MEDICAL CENTER | Age: 78
End: 2018-01-04
Attending: INTERNAL MEDICINE
Payer: COMMERCIAL

## 2018-01-04 VITALS — HEART RATE: 76 BPM | DIASTOLIC BLOOD PRESSURE: 62 MMHG | SYSTOLIC BLOOD PRESSURE: 174 MMHG

## 2018-01-04 DIAGNOSIS — I48.19 PERSISTENT ATRIAL FIBRILLATION (HCC): ICD-10-CM

## 2018-01-04 LAB — INR PPP: 2.2 (ref 2–3.5)

## 2018-01-04 PROCEDURE — 85610 PROTHROMBIN TIME: CPT

## 2018-01-04 PROCEDURE — 99211 OFF/OP EST MAY X REQ PHY/QHP: CPT

## 2018-01-04 NOTE — PROGRESS NOTES
Anticoagulation Summary  As of 1/4/2018    INR goal:   2.0-3.0   TTR:   69.3 % (1.9 y)   Today's INR:   2.2   Maintenance plan:   2.5 mg (5 mg x 0.5) on Mon; 5 mg (5 mg x 1) all other days   Weekly total:   32.5 mg   Plan last modified:   Rosita CaputoD (11/16/2017)   Next INR check:   2/15/2018   Target end date:   Indefinite    Indications    Persistent atrial fibrillation (CMS-HCC) [I48.1]             Anticoagulation Episode Summary     INR check location:       Preferred lab:       Send INR reminders to:       Comments:         Anticoagulation Care Providers     Provider Role Specialty Phone number    Dayo Nelson M.D. Referring Cardiology [phone]    [facility] Anticoagulation Services Responsible  [phone]        Anticoagulation Patient Findings  Patient Findings     Negatives:   Signs/symptoms of thrombosis, Signs/symptoms of bleeding, Laboratory test error suspected, Change in health, Change in alcohol use, Change in activity, Upcoming invasive procedure, Emergency department visit, Upcoming dental procedure, Missed doses, Extra doses, Change in medications, Change in diet/appetite, Hospital admission, Bruising, Other complaints        History of Present Illness: follow up appointment for chronic anticoagulation with the high risk medication, warfarin for atrial fibrillation  .  Pt remains therapeutic today.  Will increase the interval for rechecking INR  Follow up in 6 weeks, to reduce risk of adverse events related to this high risk medication,  Warfarin.    Odette Cameron, PharmD

## 2018-01-05 LAB — INR BLD: 2.2 (ref 0.9–1.2)

## 2018-02-15 ENCOUNTER — ANTICOAGULATION VISIT (OUTPATIENT)
Dept: VASCULAR LAB | Facility: MEDICAL CENTER | Age: 78
End: 2018-02-15
Attending: INTERNAL MEDICINE
Payer: COMMERCIAL

## 2018-02-15 VITALS — HEART RATE: 70 BPM | DIASTOLIC BLOOD PRESSURE: 92 MMHG | SYSTOLIC BLOOD PRESSURE: 157 MMHG

## 2018-02-15 DIAGNOSIS — I48.19 PERSISTENT ATRIAL FIBRILLATION (HCC): ICD-10-CM

## 2018-02-15 LAB — INR PPP: 2 (ref 2–3.5)

## 2018-02-15 PROCEDURE — 99211 OFF/OP EST MAY X REQ PHY/QHP: CPT

## 2018-02-15 PROCEDURE — 85610 PROTHROMBIN TIME: CPT

## 2018-02-15 NOTE — PROGRESS NOTES
Anticoagulation Summary  As of 2/15/2018    INR goal:   2.0-3.0   TTR:   71.0 % (2 y)   Today's INR:   2   Maintenance plan:   2.5 mg (5 mg x 0.5) on Mon; 5 mg (5 mg x 1) all other days   Weekly total:   32.5 mg   Plan last modified:   Rosita CaputoD (11/16/2017)   Next INR check:   3/29/2018   Target end date:   Indefinite    Indications    Persistent atrial fibrillation (CMS-HCC) [I48.1]             Anticoagulation Episode Summary     INR check location:       Preferred lab:       Send INR reminders to:       Comments:         Anticoagulation Care Providers     Provider Role Specialty Phone number    Dayo Nelson M.D. Referring Cardiology [phone]    [facility] Anticoagulation Services Responsible  [phone]        Anticoagulation Patient Findings      HPI:  Rebekah Harry Hayward seen in clinic today, on anticoagulation therapy with warfarin for AF.   Changes to current medical/health status since last appt: none.   Denies signs/symptoms of bleeding and/or thrombosis since the last appt.    Denies any interval changes to diet  Denies any interval changes to medications since last appt.   Denies any complications or cost restrictions with current therapy.   BP recorded in vitals.  Confirmed dosing regimen.     A/P   INR  therapeutic.   Pt is to continue with current warfarin dosing regimen.     Follow up appointment in 6 week(s).    Matt Walters, PharmD

## 2018-02-16 LAB — INR BLD: 2 (ref 0.9–1.2)

## 2018-03-29 ENCOUNTER — ANTICOAGULATION VISIT (OUTPATIENT)
Dept: VASCULAR LAB | Facility: MEDICAL CENTER | Age: 78
End: 2018-03-29
Attending: INTERNAL MEDICINE
Payer: COMMERCIAL

## 2018-03-29 DIAGNOSIS — I48.19 PERSISTENT ATRIAL FIBRILLATION (HCC): ICD-10-CM

## 2018-03-29 LAB
INR BLD: 2.2 (ref 0.9–1.2)
INR PPP: 2.2 (ref 2–3.5)

## 2018-03-29 PROCEDURE — 99211 OFF/OP EST MAY X REQ PHY/QHP: CPT | Performed by: PHARMACIST

## 2018-03-29 PROCEDURE — 85610 PROTHROMBIN TIME: CPT

## 2018-03-29 RX ORDER — WARFARIN SODIUM 5 MG/1
TABLET ORAL
Qty: 90 TAB | Refills: 3 | Status: SHIPPED | OUTPATIENT
Start: 2018-03-29

## 2018-03-29 NOTE — PROGRESS NOTES
Anticoagulation Summary  As of 3/29/2018    INR goal:   2.0-3.0   TTR:   72.5 % (2.2 y)   Today's INR:   2.2   Maintenance plan:   2.5 mg (5 mg x 0.5) on Mon; 5 mg (5 mg x 1) all other days   Weekly total:   32.5 mg   Plan last modified:   Rosita CaputoD (11/16/2017)   Next INR check:   5/24/2018   Target end date:   Indefinite    Indications    Persistent atrial fibrillation (CMS-HCC) [I48.1]             Anticoagulation Episode Summary     INR check location:       Preferred lab:       Send INR reminders to:       Comments:         Anticoagulation Care Providers     Provider Role Specialty Phone number    Dayo Nelson M.D. Referring Cardiology [phone]    [facility] Anticoagulation Services Responsible  [phone]        Anticoagulation Patient Findings      HPI:  Rebekah Harry Hayward seen in clinic today, on anticoagulation therapy with warfarin for PAF  Changes to current medical/health status since last appt: has changed to [facility] cardiologist.   Denies signs/symptoms of bleeding and/or thrombosis since the last appt.    Denies any interval changes to diet  Denies any interval changes to medications since last appt.   Denies any complications or cost restrictions with current therapy.   BP declined.     A/P   INR  is-therapeutic.   Will continue with the same warfarin dosing.     Follow up appointment in 8 week(s).    Cecilia Mac, PharmD

## 2018-04-27 ENCOUNTER — HOSPITAL ENCOUNTER (OUTPATIENT)
Dept: HOSPITAL 8 - CFH | Age: 78
Discharge: HOME | End: 2018-04-27
Attending: INTERNAL MEDICINE
Payer: COMMERCIAL

## 2018-04-27 DIAGNOSIS — R07.9: ICD-10-CM

## 2018-04-27 DIAGNOSIS — I10: ICD-10-CM

## 2018-04-27 DIAGNOSIS — I48.0: Primary | ICD-10-CM

## 2018-04-27 DIAGNOSIS — I08.3: ICD-10-CM

## 2018-04-27 PROCEDURE — A9502 TC99M TETROFOSMIN: HCPCS

## 2018-04-27 PROCEDURE — 93017 CV STRESS TEST TRACING ONLY: CPT

## 2018-04-27 PROCEDURE — 78452 HT MUSCLE IMAGE SPECT MULT: CPT

## 2018-04-27 PROCEDURE — 93306 TTE W/DOPPLER COMPLETE: CPT

## 2018-05-02 ENCOUNTER — APPOINTMENT (RX ONLY)
Dept: URBAN - METROPOLITAN AREA CLINIC 4 | Facility: CLINIC | Age: 78
Setting detail: DERMATOLOGY
End: 2018-05-02

## 2018-05-02 DIAGNOSIS — L82.1 OTHER SEBORRHEIC KERATOSIS: ICD-10-CM

## 2018-05-02 DIAGNOSIS — D18.0 HEMANGIOMA: ICD-10-CM

## 2018-05-02 DIAGNOSIS — L72.0 EPIDERMAL CYST: ICD-10-CM

## 2018-05-02 DIAGNOSIS — D22 MELANOCYTIC NEVI: ICD-10-CM

## 2018-05-02 DIAGNOSIS — L81.4 OTHER MELANIN HYPERPIGMENTATION: ICD-10-CM

## 2018-05-02 PROBLEM — D22.72 MELANOCYTIC NEVI OF LEFT LOWER LIMB, INCLUDING HIP: Status: ACTIVE | Noted: 2018-05-02

## 2018-05-02 PROBLEM — D22.5 MELANOCYTIC NEVI OF TRUNK: Status: ACTIVE | Noted: 2018-05-02

## 2018-05-02 PROBLEM — D18.01 HEMANGIOMA OF SKIN AND SUBCUTANEOUS TISSUE: Status: ACTIVE | Noted: 2018-05-02

## 2018-05-02 PROBLEM — L57.0 ACTINIC KERATOSIS: Status: ACTIVE | Noted: 2018-05-02

## 2018-05-02 PROBLEM — D22.71 MELANOCYTIC NEVI OF RIGHT LOWER LIMB, INCLUDING HIP: Status: ACTIVE | Noted: 2018-05-02

## 2018-05-02 PROBLEM — D22.61 MELANOCYTIC NEVI OF RIGHT UPPER LIMB, INCLUDING SHOULDER: Status: ACTIVE | Noted: 2018-05-02

## 2018-05-02 PROBLEM — D22.62 MELANOCYTIC NEVI OF LEFT UPPER LIMB, INCLUDING SHOULDER: Status: ACTIVE | Noted: 2018-05-02

## 2018-05-02 PROBLEM — D48.5 NEOPLASM OF UNCERTAIN BEHAVIOR OF SKIN: Status: ACTIVE | Noted: 2018-05-02

## 2018-05-02 PROCEDURE — ? COUNSELING

## 2018-05-02 PROCEDURE — ? BIOPSY BY SHAVE METHOD

## 2018-05-02 PROCEDURE — 11100: CPT

## 2018-05-02 PROCEDURE — 99214 OFFICE O/P EST MOD 30 MIN: CPT | Mod: 25

## 2018-05-02 PROCEDURE — ? SUNSCREEN RECOMMENDATIONS

## 2018-05-02 ASSESSMENT — LOCATION DETAILED DESCRIPTION DERM
LOCATION DETAILED: RIGHT RADIAL DORSAL HAND
LOCATION DETAILED: LEFT CENTRAL MALAR CHEEK
LOCATION DETAILED: RIGHT ANTERIOR PROXIMAL THIGH
LOCATION DETAILED: LEFT SUPERIOR MEDIAL UPPER BACK
LOCATION DETAILED: RIGHT SUPERIOR MEDIAL MIDBACK
LOCATION DETAILED: RIGHT RIB CAGE
LOCATION DETAILED: LEFT ULNAR DORSAL HAND
LOCATION DETAILED: RIGHT ANTERIOR DISTAL THIGH
LOCATION DETAILED: INFERIOR MID FOREHEAD
LOCATION DETAILED: RIGHT DISTAL POSTERIOR UPPER ARM
LOCATION DETAILED: RIGHT CENTRAL MALAR CHEEK
LOCATION DETAILED: LEFT PROXIMAL DORSAL FOREARM
LOCATION DETAILED: PERIUMBILICAL SKIN
LOCATION DETAILED: LEFT PROXIMAL POSTERIOR UPPER ARM
LOCATION DETAILED: RIGHT PROXIMAL DORSAL FOREARM
LOCATION DETAILED: SUPERIOR THORACIC SPINE
LOCATION DETAILED: LEFT INFERIOR ANTERIOR NECK
LOCATION DETAILED: LEFT ANTERIOR PROXIMAL THIGH

## 2018-05-02 ASSESSMENT — LOCATION SIMPLE DESCRIPTION DERM
LOCATION SIMPLE: RIGHT CHEEK
LOCATION SIMPLE: INFERIOR FOREHEAD
LOCATION SIMPLE: RIGHT LOWER BACK
LOCATION SIMPLE: LEFT POSTERIOR UPPER ARM
LOCATION SIMPLE: LEFT ANTERIOR NECK
LOCATION SIMPLE: RIGHT FOREARM
LOCATION SIMPLE: LEFT FOREARM
LOCATION SIMPLE: RIGHT THIGH
LOCATION SIMPLE: ABDOMEN
LOCATION SIMPLE: RIGHT HAND
LOCATION SIMPLE: LEFT THIGH
LOCATION SIMPLE: UPPER BACK
LOCATION SIMPLE: LEFT UPPER BACK
LOCATION SIMPLE: LEFT CHEEK
LOCATION SIMPLE: RIGHT POSTERIOR UPPER ARM
LOCATION SIMPLE: LEFT HAND

## 2018-05-02 ASSESSMENT — LOCATION ZONE DERM
LOCATION ZONE: HAND
LOCATION ZONE: ARM
LOCATION ZONE: NECK
LOCATION ZONE: TRUNK
LOCATION ZONE: FACE
LOCATION ZONE: LEG

## 2018-05-02 NOTE — PROCEDURE: BIOPSY BY SHAVE METHOD
Lab: [id number]
Bill [id number] For Specimen Handling/Conveyance To Laboratory?: no
Lab Facility: 
Consent: Written consent was obtained and risks were reviewed including but not limited to scarring, infection, bleeding, scabbing, incomplete removal, nerve damage and allergy to anesthesia.
Biopsy Type: H and E
Hemostasis: Drysol
Additional Anesthesia Volume In Cc (Will Not Render If 0): 0
Biopsy Method: Personna blade
Electrodesiccation And Curettage Text: The wound bed was treated with electrodesiccation and curettage after the biopsy was performed.
Silver Nitrate Text: The wound bed was treated with silver nitrate after the biopsy was performed.
Was A Bandage Applied: Yes
Cryotherapy Text: The wound bed was treated with cryotherapy after the biopsy was performed.
Notification Instructions: Patient will be notified of biopsy results. However, patient instructed to call the office if not contacted within 2 weeks.
Detail Level: Detailed
Dressing: bandage
Billing Type: Third-Party Bill
Electrodesiccation Text: The wound bed was treated with electrodesiccation after the biopsy was performed.
Wound Care: Bacitracin
Anesthesia Volume In Cc: 2
Curettage Text: The wound bed was treated with curettage after the biopsy was performed.
Anesthesia Type: 1% lidocaine with epinephrine
Type Of Destruction Used: Curettage
Post-Care Instructions: I reviewed with the patient in detail post-care instructions. Patient is to keep the biopsy site dry overnight, and then apply bacitracin twice daily until healed. Patient may apply hydrogen peroxide soaks to remove any crusting.

## 2018-05-24 ENCOUNTER — ANTICOAGULATION VISIT (OUTPATIENT)
Dept: VASCULAR LAB | Facility: MEDICAL CENTER | Age: 78
End: 2018-05-24
Attending: INTERNAL MEDICINE
Payer: COMMERCIAL

## 2018-05-24 VITALS — DIASTOLIC BLOOD PRESSURE: 84 MMHG | HEART RATE: 70 BPM | SYSTOLIC BLOOD PRESSURE: 131 MMHG

## 2018-05-24 DIAGNOSIS — I48.19 PERSISTENT ATRIAL FIBRILLATION (HCC): ICD-10-CM

## 2018-05-24 LAB — INR PPP: 1.8 (ref 2–3.5)

## 2018-05-24 PROCEDURE — 85610 PROTHROMBIN TIME: CPT

## 2018-05-24 PROCEDURE — 99212 OFFICE O/P EST SF 10 MIN: CPT

## 2018-05-24 RX ORDER — CHLORTHALIDONE 25 MG/1
25 TABLET ORAL DAILY
COMMUNITY
End: 2018-10-22

## 2018-05-24 RX ORDER — OXYBUTYNIN CHLORIDE 5 MG/1
5 TABLET ORAL 3 TIMES DAILY
COMMUNITY
End: 2018-08-20

## 2018-05-24 RX ORDER — ATORVASTATIN CALCIUM 20 MG/1
20 TABLET, FILM COATED ORAL NIGHTLY
COMMUNITY

## 2018-05-24 RX ORDER — GABAPENTIN 100 MG/1
100 CAPSULE ORAL 3 TIMES DAILY
COMMUNITY
End: 2018-07-09

## 2018-05-24 NOTE — PROGRESS NOTES
Anticoagulation Summary  As of 5/24/2018    INR goal:   2.0-3.0   TTR:   71.0 % (2.3 y)   Today's INR:   1.8!   Warfarin maintenance plan:   5 mg (5 mg x 1) every day   Weekly warfarin total:   35 mg   Plan last modified:   Matt Walters, PharmD (5/24/2018)   Next INR check:   6/7/2018   Target end date:   Indefinite    Indications    Persistent atrial fibrillation (HCC) [I48.1]             Anticoagulation Episode Summary     INR check location:       Preferred lab:       Send INR reminders to:       Comments:         Anticoagulation Care Providers     Provider Role Specialty Phone number    Dayo Nelson M.D. Referring Cardiology [phone]    [facility] Responsible  [phone]        Anticoagulation Patient Findings      HPI:  Rebekah Hayward seen in clinic today, on anticoagulation therapy with warfarin for AF  Changes to current medical/health status since last appt: none  Denies signs/symptoms of bleeding and/or thrombosis since the last appt.    Denies any interval changes to diet  Some medications changed, but overall no clinical difference.   Denies any complications or cost restrictions with current therapy.   BP recorded in vitals.  .cof     A/P   INR  SUB-therapeutic.   Begin 7% increased regimen.     Follow up appointment in 2 week(s).    Matt Walters, PharmD

## 2018-05-29 LAB — INR BLD: 1.8 (ref 0.9–1.2)

## 2018-06-07 ENCOUNTER — ANTICOAGULATION VISIT (OUTPATIENT)
Dept: VASCULAR LAB | Facility: MEDICAL CENTER | Age: 78
End: 2018-06-07
Attending: INTERNAL MEDICINE
Payer: COMMERCIAL

## 2018-06-07 VITALS — SYSTOLIC BLOOD PRESSURE: 145 MMHG | DIASTOLIC BLOOD PRESSURE: 86 MMHG | HEART RATE: 69 BPM

## 2018-06-07 DIAGNOSIS — I48.19 PERSISTENT ATRIAL FIBRILLATION (HCC): ICD-10-CM

## 2018-06-07 LAB — INR PPP: 2.5 (ref 2–3.5)

## 2018-06-07 PROCEDURE — 99211 OFF/OP EST MAY X REQ PHY/QHP: CPT

## 2018-06-07 PROCEDURE — 85610 PROTHROMBIN TIME: CPT

## 2018-06-07 NOTE — PROGRESS NOTES
Anticoagulation Summary  As of 6/7/2018    INR goal:   2.0-3.0   TTR:   71.0 % (2.4 y)   Today's INR:   2.5   Warfarin maintenance plan:   5 mg (5 mg x 1) every day   Weekly warfarin total:   35 mg   Plan last modified:   Matt Walters, PharmD (5/24/2018)   Next INR check:   7/5/2018   Target end date:   Indefinite    Indications    Persistent atrial fibrillation (HCC) [I48.1]             Anticoagulation Episode Summary     INR check location:       Preferred lab:       Send INR reminders to:       Comments:         Anticoagulation Care Providers     Provider Role Specialty Phone number    Dayo Nelson M.D. Referring Cardiology [phone]    [facility] Responsible  [phone]        Anticoagulation Patient Findings      HPI:  Rebekah Harry Hayward seen in clinic today, on anticoagulation therapy with warfarin for AF.  Changes to current medical/health status since last appt: none  Denies signs/symptoms of bleeding and/or thrombosis since the last appt.    Denies any interval changes to diet  Denies any interval changes to medications since last appt.   Denies any complications or cost restrictions with current therapy.   BP recorded in vitals.  Confirmed dosing regimen.     A/P   INR  therapeutic.   Pt is to continue with current warfarin dosing regimen.     Follow up appointment in 4 week(s).    Matt Walters, RositaD

## 2018-06-08 LAB — INR BLD: 2.5 (ref 0.9–1.2)

## 2018-07-06 ENCOUNTER — APPOINTMENT (OUTPATIENT)
Dept: VASCULAR LAB | Facility: MEDICAL CENTER | Age: 78
End: 2018-07-06
Attending: INTERNAL MEDICINE
Payer: COMMERCIAL

## 2018-07-09 ENCOUNTER — ANTICOAGULATION VISIT (OUTPATIENT)
Dept: VASCULAR LAB | Facility: MEDICAL CENTER | Age: 78
End: 2018-07-09
Attending: INTERNAL MEDICINE
Payer: COMMERCIAL

## 2018-07-09 VITALS — HEART RATE: 69 BPM | SYSTOLIC BLOOD PRESSURE: 143 MMHG | DIASTOLIC BLOOD PRESSURE: 76 MMHG

## 2018-07-09 DIAGNOSIS — I48.19 PERSISTENT ATRIAL FIBRILLATION (HCC): ICD-10-CM

## 2018-07-09 LAB
INR BLD: 2.6 (ref 0.9–1.2)
INR PPP: 2.6 (ref 2–3.5)

## 2018-07-09 PROCEDURE — 99211 OFF/OP EST MAY X REQ PHY/QHP: CPT | Performed by: NURSE PRACTITIONER

## 2018-07-09 PROCEDURE — 85610 PROTHROMBIN TIME: CPT

## 2018-07-09 NOTE — PROGRESS NOTES
Anticoagulation Summary  As of 7/9/2018    INR goal:   2.0-3.0   TTR:   72.1 % (2.4 y)   Today's INR:   2.6   Warfarin maintenance plan:   5 mg (5 mg x 1) every day   Weekly warfarin total:   35 mg   No change documented:   LEXIS Hazel   Plan last modified:   Matt Walters, PharmD (5/24/2018)   Next INR check:   8/20/2018   Target end date:   Indefinite    Indications    Persistent atrial fibrillation (HCC) [I48.1]             Anticoagulation Episode Summary     INR check location:       Preferred lab:       Send INR reminders to:       Comments:         Anticoagulation Care Providers     Provider Role Specialty Phone number    Dayo Nelson M.D. Referring Cardiology [phone]    [facility] Anticoagulation Services Responsible  [phone]        Anticoagulation Patient Findings      HPI:  Rebekah Harry Hayward seen in clinic today for follow up on anticoagulation therapy in the presence of AF. Denies any changes to current medical/health status since last appointment. No longer taking gabapentin. Denies any diet changes. No current symptoms of bleeding or thrombosis reported.    A/P:   INR therapeutic. Continue current regimen. BP recorded in vitals.    Follow up appointment in 6 week(s).    Next Appointment: Monday, August 20, 2018 at 7:30 am.     Tiffany ZAZUETA

## 2018-08-20 ENCOUNTER — ANTICOAGULATION VISIT (OUTPATIENT)
Dept: VASCULAR LAB | Facility: MEDICAL CENTER | Age: 78
End: 2018-08-20
Attending: INTERNAL MEDICINE
Payer: COMMERCIAL

## 2018-08-20 VITALS — DIASTOLIC BLOOD PRESSURE: 96 MMHG | HEART RATE: 69 BPM | SYSTOLIC BLOOD PRESSURE: 147 MMHG

## 2018-08-20 DIAGNOSIS — I48.19 PERSISTENT ATRIAL FIBRILLATION (HCC): ICD-10-CM

## 2018-08-20 LAB — INR PPP: 2.4 (ref 2–3.5)

## 2018-08-20 PROCEDURE — 85610 PROTHROMBIN TIME: CPT

## 2018-08-20 PROCEDURE — 99211 OFF/OP EST MAY X REQ PHY/QHP: CPT

## 2018-08-20 NOTE — PROGRESS NOTES
Anticoagulation Summary  As of 8/20/2018    INR goal:   2.0-3.0   TTR:   73.3 % (2.6 y)   Today's INR:   2.4   Warfarin maintenance plan:   5 mg (5 mg x 1) every day   Weekly warfarin total:   35 mg   Plan last modified:   Matt Walters, PharmD (5/24/2018)   Next INR check:   10/1/2018   Target end date:   Indefinite    Indications    Persistent atrial fibrillation (HCC) [I48.1]             Anticoagulation Episode Summary     INR check location:       Preferred lab:       Send INR reminders to:       Comments:         Anticoagulation Care Providers     Provider Role Specialty Phone number    Dayo Nelson M.D. Referring Cardiology [phone]    [facility] Responsible  [phone]        Anticoagulation Patient Findings      HPI:  Rebekah Hayward seen in clinic today, on anticoagulation therapy with warfarin for Afib  Changes to current medical/health status since last appt: none  Denies signs/symptoms of bleeding and/or thrombosis since the last appt.    Denies any interval changes to diet  Denies any interval changes to medications since last appt.   Denies any complications or cost restrictions with current therapy.   BP recorded in vitals.      A/P   INR  therapeutic.     Follow up appointment in 6 week(s).    Noemi Albright, PharmD

## 2018-08-21 LAB — INR BLD: 2.4 (ref 0.9–1.2)

## 2018-09-05 ENCOUNTER — HOSPITAL ENCOUNTER (OUTPATIENT)
Dept: HOSPITAL 8 - CVU | Age: 78
Discharge: HOME | End: 2018-09-05
Attending: NURSE PRACTITIONER
Payer: COMMERCIAL

## 2018-09-05 DIAGNOSIS — I83.93: ICD-10-CM

## 2018-09-05 DIAGNOSIS — I87.2: ICD-10-CM

## 2018-09-05 DIAGNOSIS — I65.23: Primary | ICD-10-CM

## 2018-09-05 DIAGNOSIS — M79.89: ICD-10-CM

## 2018-09-05 PROCEDURE — 93970 EXTREMITY STUDY: CPT

## 2018-09-05 PROCEDURE — 93880 EXTRACRANIAL BILAT STUDY: CPT

## 2018-09-28 ENCOUNTER — HOSPITAL ENCOUNTER (OUTPATIENT)
Dept: HOSPITAL 8 - ED | Age: 78
Setting detail: OBSERVATION
LOS: 1 days | Discharge: HOME | End: 2018-09-29
Attending: INTERNAL MEDICINE | Admitting: INTERNAL MEDICINE
Payer: COMMERCIAL

## 2018-09-28 VITALS — BODY MASS INDEX: 26.99 KG/M2 | HEIGHT: 67 IN | WEIGHT: 171.96 LBS

## 2018-09-28 DIAGNOSIS — N39.0: ICD-10-CM

## 2018-09-28 DIAGNOSIS — G47.33: ICD-10-CM

## 2018-09-28 DIAGNOSIS — D75.89: ICD-10-CM

## 2018-09-28 DIAGNOSIS — R19.7: ICD-10-CM

## 2018-09-28 DIAGNOSIS — R55: Primary | ICD-10-CM

## 2018-09-28 DIAGNOSIS — I48.0: ICD-10-CM

## 2018-09-28 DIAGNOSIS — E87.1: ICD-10-CM

## 2018-09-28 DIAGNOSIS — E87.6: ICD-10-CM

## 2018-09-28 LAB
ALBUMIN SERPL-MCNC: 3.5 G/DL (ref 3.4–5)
ALP SERPL-CCNC: 41 U/L (ref 45–117)
ALT SERPL-CCNC: 41 U/L (ref 12–78)
ANION GAP SERPL CALC-SCNC: 9 MMOL/L (ref 5–15)
BASOPHILS # BLD AUTO: 0.01 X10^3/UL (ref 0–0.1)
BASOPHILS NFR BLD AUTO: 0 % (ref 0–1)
BILIRUB SERPL-MCNC: 1 MG/DL (ref 0.2–1)
CALCIUM SERPL-MCNC: 8 MG/DL (ref 8.5–10.1)
CHLORIDE SERPL-SCNC: 101 MMOL/L (ref 98–107)
CREAT SERPL-MCNC: 0.78 MG/DL (ref 0.55–1.02)
CULTURE INDICATED?: YES
EOSINOPHIL # BLD AUTO: 0 X10^3/UL (ref 0–0.4)
EOSINOPHIL NFR BLD AUTO: 0 % (ref 1–7)
ERYTHROCYTE [DISTWIDTH] IN BLOOD BY AUTOMATED COUNT: 13.2 % (ref 9.6–15.2)
INR PPP: 3.63 (ref 0.93–1.1)
LYMPHOCYTES # BLD AUTO: 0.52 X10^3/UL (ref 1–3.4)
LYMPHOCYTES NFR BLD AUTO: 11 % (ref 22–44)
MCH RBC QN AUTO: 34.6 PG (ref 27–34.8)
MCHC RBC AUTO-ENTMCNC: 34.2 G/DL (ref 32.4–35.8)
MCV RBC AUTO: 101.3 FL (ref 80–100)
MD: NO
MICROSCOPIC: (no result)
MONOCYTES # BLD AUTO: 0.39 X10^3/UL (ref 0.2–0.8)
MONOCYTES NFR BLD AUTO: 8 % (ref 2–9)
NEUTROPHILS # BLD AUTO: 3.7 X10^3/UL (ref 1.8–6.8)
NEUTROPHILS NFR BLD AUTO: 80 % (ref 42–75)
PLATELET # BLD AUTO: 138 X10^3/UL (ref 130–400)
PMV BLD AUTO: 8.7 FL (ref 7.4–10.4)
PROT SERPL-MCNC: 6.3 G/DL (ref 6.4–8.2)
PROTHROMBIN TIME: 36.8 SECONDS (ref 9.6–11.5)
RBC # BLD AUTO: 3.67 X10^6/UL (ref 3.82–5.3)
TROPONIN I SERPL-MCNC: < 0.015 NG/ML (ref 0–0.04)

## 2018-09-28 PROCEDURE — 80053 COMPREHEN METABOLIC PANEL: CPT

## 2018-09-28 PROCEDURE — 87040 BLOOD CULTURE FOR BACTERIA: CPT

## 2018-09-28 PROCEDURE — 85610 PROTHROMBIN TIME: CPT

## 2018-09-28 PROCEDURE — 71045 X-RAY EXAM CHEST 1 VIEW: CPT

## 2018-09-28 PROCEDURE — 83735 ASSAY OF MAGNESIUM: CPT

## 2018-09-28 PROCEDURE — 83605 ASSAY OF LACTIC ACID: CPT

## 2018-09-28 PROCEDURE — 85025 COMPLETE CBC W/AUTO DIFF WBC: CPT

## 2018-09-28 PROCEDURE — 96365 THER/PROPH/DIAG IV INF INIT: CPT

## 2018-09-28 PROCEDURE — 84484 ASSAY OF TROPONIN QUANT: CPT

## 2018-09-28 PROCEDURE — 87086 URINE CULTURE/COLONY COUNT: CPT

## 2018-09-28 PROCEDURE — 96366 THER/PROPH/DIAG IV INF ADDON: CPT

## 2018-09-28 PROCEDURE — 82746 ASSAY OF FOLIC ACID SERUM: CPT

## 2018-09-28 PROCEDURE — 93005 ELECTROCARDIOGRAM TRACING: CPT

## 2018-09-28 PROCEDURE — G0378 HOSPITAL OBSERVATION PER HR: HCPCS

## 2018-09-28 PROCEDURE — 82607 VITAMIN B-12: CPT

## 2018-09-28 PROCEDURE — 96367 TX/PROPH/DG ADDL SEQ IV INF: CPT

## 2018-09-28 PROCEDURE — 81001 URINALYSIS AUTO W/SCOPE: CPT

## 2018-09-28 PROCEDURE — 99285 EMERGENCY DEPT VISIT HI MDM: CPT

## 2018-09-28 PROCEDURE — 87147 CULTURE TYPE IMMUNOLOGIC: CPT

## 2018-09-28 PROCEDURE — 36415 COLL VENOUS BLD VENIPUNCTURE: CPT

## 2018-09-28 PROCEDURE — 94660 CPAP INITIATION&MGMT: CPT

## 2018-09-29 VITALS — DIASTOLIC BLOOD PRESSURE: 61 MMHG | SYSTOLIC BLOOD PRESSURE: 97 MMHG

## 2018-09-29 VITALS — DIASTOLIC BLOOD PRESSURE: 71 MMHG | SYSTOLIC BLOOD PRESSURE: 125 MMHG

## 2018-09-29 VITALS — SYSTOLIC BLOOD PRESSURE: 107 MMHG | DIASTOLIC BLOOD PRESSURE: 69 MMHG

## 2018-09-29 LAB
ALBUMIN SERPL-MCNC: 3.2 G/DL (ref 3.4–5)
ALP SERPL-CCNC: 37 U/L (ref 45–117)
ALT SERPL-CCNC: 35 U/L (ref 12–78)
ANION GAP SERPL CALC-SCNC: 7 MMOL/L (ref 5–15)
BASOPHILS # BLD AUTO: 0.01 X10^3/UL (ref 0–0.1)
BASOPHILS NFR BLD AUTO: 0 % (ref 0–1)
BILIRUB SERPL-MCNC: 0.7 MG/DL (ref 0.2–1)
CALCIUM SERPL-MCNC: 8 MG/DL (ref 8.5–10.1)
CHLORIDE SERPL-SCNC: 105 MMOL/L (ref 98–107)
CREAT SERPL-MCNC: 0.68 MG/DL (ref 0.55–1.02)
EOSINOPHIL # BLD AUTO: 0 X10^3/UL (ref 0–0.4)
EOSINOPHIL NFR BLD AUTO: 0 % (ref 1–7)
ERYTHROCYTE [DISTWIDTH] IN BLOOD BY AUTOMATED COUNT: 13.4 % (ref 9.6–15.2)
FOLATE SERPL-MCNC: 18.4 NG/ML (ref 3.1–17.5)
LYMPHOCYTES # BLD AUTO: 0.83 X10^3/UL (ref 1–3.4)
LYMPHOCYTES NFR BLD AUTO: 21 % (ref 22–44)
MCH RBC QN AUTO: 34.2 PG (ref 27–34.8)
MCHC RBC AUTO-ENTMCNC: 33.9 G/DL (ref 32.4–35.8)
MCV RBC AUTO: 100.8 FL (ref 80–100)
MD: NO
MONOCYTES # BLD AUTO: 0.54 X10^3/UL (ref 0.2–0.8)
MONOCYTES NFR BLD AUTO: 14 % (ref 2–9)
NEUTROPHILS # BLD AUTO: 2.53 X10^3/UL (ref 1.8–6.8)
NEUTROPHILS NFR BLD AUTO: 65 % (ref 42–75)
PLATELET # BLD AUTO: 131 X10^3/UL (ref 130–400)
PMV BLD AUTO: 8.6 FL (ref 7.4–10.4)
PROT SERPL-MCNC: 5.8 G/DL (ref 6.4–8.2)
RBC # BLD AUTO: 3.54 X10^6/UL (ref 3.82–5.3)
TROPONIN I SERPL-MCNC: < 0.015 NG/ML (ref 0–0.04)

## 2018-09-29 RX ADMIN — SODIUM CHLORIDE AND POTASSIUM CHLORIDE SCH MLS/HR: .9; .15 SOLUTION INTRAVENOUS at 04:38

## 2018-09-29 RX ADMIN — SODIUM CHLORIDE AND POTASSIUM CHLORIDE SCH MLS/HR: .9; .15 SOLUTION INTRAVENOUS at 12:00

## 2018-10-01 ENCOUNTER — ANTICOAGULATION VISIT (OUTPATIENT)
Dept: VASCULAR LAB | Facility: MEDICAL CENTER | Age: 78
End: 2018-10-01
Attending: INTERNAL MEDICINE
Payer: COMMERCIAL

## 2018-10-01 DIAGNOSIS — I48.19 PERSISTENT ATRIAL FIBRILLATION (HCC): ICD-10-CM

## 2018-10-01 LAB
INR BLD: 5.1 (ref 0.9–1.2)
INR PPP: 5.1 (ref 2–3.5)

## 2018-10-01 PROCEDURE — 85610 PROTHROMBIN TIME: CPT

## 2018-10-01 PROCEDURE — 99212 OFFICE O/P EST SF 10 MIN: CPT | Performed by: NURSE PRACTITIONER

## 2018-10-01 NOTE — PROGRESS NOTES
Anticoagulation Summary  As of 10/1/2018    INR goal:   2.0-3.0   TTR:   71.1 % (2.7 y)   Today's INR:   5.1!   Warfarin maintenance plan:   5 mg (5 mg x 1) every day   Weekly warfarin total:   35 mg   Plan last modified:   Matt Walters, PharmD (5/24/2018)   Next INR check:      Target end date:   Indefinite    Indications    Persistent atrial fibrillation (HCC) [I48.1]             Anticoagulation Episode Summary     INR check location:       Preferred lab:       Send INR reminders to:       Comments:         Anticoagulation Care Providers     Provider Role Specialty Phone number    Dayo Nelson M.D. Referring Cardiology [phone]    [facility] Responsible  [phone]        Anticoagulation Patient Findings      HPI:  Rebekah Harry Hayward seen in clinic today for follow up on anticoagulation therapy in the presence of AF. In the ER on Friday s/p GLR. Work up was negative though her BP was low. INR 3.2 at [facility]. Chlorthalidone currently on hold. Reports having diarrhea the 4 days prior. Diet normal. No current symptoms of bleeding or thrombosis reported.    A/P:   INR supratherapeutic. Will decrease regimen this week. BP recorded in vitals.    Follow up appointment in 1 week(s).    Next Appointment: Monday, October 8, 2018 at 8:00 am.     Tiffany ZAZUETA

## 2018-10-08 ENCOUNTER — ANTICOAGULATION VISIT (OUTPATIENT)
Dept: VASCULAR LAB | Facility: MEDICAL CENTER | Age: 78
End: 2018-10-08
Attending: INTERNAL MEDICINE
Payer: COMMERCIAL

## 2018-10-08 VITALS — SYSTOLIC BLOOD PRESSURE: 140 MMHG | DIASTOLIC BLOOD PRESSURE: 83 MMHG | HEART RATE: 59 BPM

## 2018-10-08 DIAGNOSIS — I48.19 PERSISTENT ATRIAL FIBRILLATION (HCC): ICD-10-CM

## 2018-10-08 LAB — INR PPP: 2 (ref 2–3.5)

## 2018-10-08 PROCEDURE — 99211 OFF/OP EST MAY X REQ PHY/QHP: CPT

## 2018-10-08 PROCEDURE — 85610 PROTHROMBIN TIME: CPT

## 2018-10-08 NOTE — PROGRESS NOTES
Anticoagulation Summary  As of 10/8/2018    INR goal:   2.0-3.0   TTR:   70.9 % (2.7 y)   Today's INR:   2.0   Warfarin maintenance plan:   5 mg (5 mg x 1) every day   Weekly warfarin total:   35 mg   Plan last modified:   Matt Walters, PharmD (5/24/2018)   Next INR check:   10/22/2018   Target end date:   Indefinite    Indications    Persistent atrial fibrillation (HCC) [I48.1]             Anticoagulation Episode Summary     INR check location:       Preferred lab:       Send INR reminders to:       Comments:         Anticoagulation Care Providers     Provider Role Specialty Phone number    Dayo Nelson M.D. Referring Cardiology [phone]    [facility] Responsible  [phone]        Anticoagulation Patient Findings      HPI:  Rebekah Hayward seen in clinic today, on anticoagulation therapy with warfrain for Afib  Changes to current medical/health status since last appt: started low-dose opioid after GLF  Denies signs/symptoms of bleeding and/or thrombosis since the last appt.    Denies any interval changes to diet  Denies any interval changes to medications since last appt.   Denies any complications or cost restrictions with current therapy.   BP recorded in vitals. BP elevated > 130 SBP, however home readings at 120s and most recent hospital reading in 110s.       A/P   INR  therapeutic.   Instructed patient to continue current dose.    Follow up appointment in 2 week(s).    Noemi Albright, PharmD

## 2018-10-09 LAB — INR BLD: 2 (ref 0.9–1.2)

## 2018-10-22 ENCOUNTER — ANTICOAGULATION VISIT (OUTPATIENT)
Dept: VASCULAR LAB | Facility: MEDICAL CENTER | Age: 78
End: 2018-10-22
Attending: INTERNAL MEDICINE
Payer: COMMERCIAL

## 2018-10-22 VITALS — HEART RATE: 67 BPM | SYSTOLIC BLOOD PRESSURE: 144 MMHG | DIASTOLIC BLOOD PRESSURE: 83 MMHG

## 2018-10-22 DIAGNOSIS — I48.19 PERSISTENT ATRIAL FIBRILLATION (HCC): ICD-10-CM

## 2018-10-22 LAB
INR BLD: 3.8 (ref 0.9–1.2)
INR PPP: 3.8 (ref 2–3.5)

## 2018-10-22 PROCEDURE — 99212 OFFICE O/P EST SF 10 MIN: CPT | Performed by: NURSE PRACTITIONER

## 2018-10-22 PROCEDURE — 85610 PROTHROMBIN TIME: CPT

## 2018-10-22 RX ORDER — LISINOPRIL 10 MG/1
10 TABLET ORAL DAILY
COMMUNITY

## 2018-10-22 NOTE — PROGRESS NOTES
Anticoagulation Summary  As of 10/22/2018    INR goal:   2.0-3.0   TTR:   70.6 % (2.7 y)   Today's INR:   3.8!   Warfarin maintenance plan:   2.5 mg (5 mg x 0.5) on Mon; 5 mg (5 mg x 1) all other days   Weekly warfarin total:   32.5 mg   Plan last modified:   BUZZ HazelPLAMINE (10/22/2018)   Next INR check:   11/5/2018   Target end date:   Indefinite    Indications    Persistent atrial fibrillation (HCC) [I48.1]             Anticoagulation Episode Summary     INR check location:       Preferred lab:       Send INR reminders to:       Comments:         Anticoagulation Care Providers     Provider Role Specialty Phone number    Dayo Nelson M.D. Referring Cardiology [phone]    [facility] Responsible  [phone]        Anticoagulation Patient Findings      HPI:  Rebekah Hayward seen in clinic today for follow up on anticoagulation therapy in the presence of AF. Denies any changes to current medical/health status since last appointment. Taking Lisinopril instead of chlorthalidone now. Denies any diet changes. No current symptoms of bleeding or thrombosis reported.    A/P:   INR supratherapeutic. INR trending high. Will decrease regimen. BP recorded in vitals.    Follow up appointment in 2 week(s).    Next Appointment: Tuesday, November 6, 2018 at 7:45 am.     Tiffany ZAZUETA

## 2018-11-06 ENCOUNTER — ANTICOAGULATION VISIT (OUTPATIENT)
Dept: VASCULAR LAB | Facility: MEDICAL CENTER | Age: 78
End: 2018-11-06
Attending: INTERNAL MEDICINE
Payer: COMMERCIAL

## 2018-11-06 VITALS — SYSTOLIC BLOOD PRESSURE: 155 MMHG | DIASTOLIC BLOOD PRESSURE: 99 MMHG | HEART RATE: 68 BPM

## 2018-11-06 DIAGNOSIS — I48.19 PERSISTENT ATRIAL FIBRILLATION (HCC): ICD-10-CM

## 2018-11-06 LAB — INR PPP: 3.1 (ref 2–3.5)

## 2018-11-06 PROCEDURE — 85610 PROTHROMBIN TIME: CPT

## 2018-11-06 PROCEDURE — 99212 OFFICE O/P EST SF 10 MIN: CPT

## 2018-11-06 NOTE — PROGRESS NOTES
Anticoagulation Summary  As of 11/6/2018    INR goal:   2.0-3.0   TTR:   69.6 % (2.8 y)   Today's INR:   3.1!   Warfarin maintenance plan:   2.5 mg (5 mg x 0.5) on Mon; 5 mg (5 mg x 1) all other days   Weekly warfarin total:   32.5 mg   Plan last modified:   LEXIS Hazel (10/22/2018)   Next INR check:   11/20/2018   Target end date:   Indefinite    Indications    Persistent atrial fibrillation (HCC) [I48.1]             Anticoagulation Episode Summary     INR check location:       Preferred lab:       Send INR reminders to:       Comments:         Anticoagulation Care Providers     Provider Role Specialty Phone number    Dayo Nelson M.D. Referring Cardiology [phone]    [facility] Responsible  [phone]        Anticoagulation Patient Findings      HPI:  Rebekah Hayward seen in clinic today, on anticoagulation therapy with warfarin for AF.   Changes to current medical/health status since last appt: none  Denies signs/symptoms of bleeding and/or thrombosis since the last appt.    Denies any interval changes to diet  Denies any interval changes to medications since last appt.   Denies any complications or cost restrictions with current therapy.   BP recorded in vitals.  BP is elevated, currently working with PCP with BP medications.    Confirmed dosing regimen.     A/P   INR  SUPRA-therapeutic.   Reduce today then Pt is to continue with current warfarin dosing regimen.     Follow up appointment in 2 week(s).    Matt Walters, PharmD

## 2018-11-08 ENCOUNTER — APPOINTMENT (RX ONLY)
Dept: URBAN - METROPOLITAN AREA CLINIC 4 | Facility: CLINIC | Age: 78
Setting detail: DERMATOLOGY
End: 2018-11-08

## 2018-11-08 DIAGNOSIS — L30.4 ERYTHEMA INTERTRIGO: ICD-10-CM

## 2018-11-08 DIAGNOSIS — L82.1 OTHER SEBORRHEIC KERATOSIS: ICD-10-CM

## 2018-11-08 DIAGNOSIS — L81.4 OTHER MELANIN HYPERPIGMENTATION: ICD-10-CM

## 2018-11-08 DIAGNOSIS — D18.0 HEMANGIOMA: ICD-10-CM

## 2018-11-08 DIAGNOSIS — D22 MELANOCYTIC NEVI: ICD-10-CM

## 2018-11-08 PROBLEM — D22.61 MELANOCYTIC NEVI OF RIGHT UPPER LIMB, INCLUDING SHOULDER: Status: ACTIVE | Noted: 2018-11-08

## 2018-11-08 PROBLEM — D22.5 MELANOCYTIC NEVI OF TRUNK: Status: ACTIVE | Noted: 2018-11-08

## 2018-11-08 PROBLEM — D18.01 HEMANGIOMA OF SKIN AND SUBCUTANEOUS TISSUE: Status: ACTIVE | Noted: 2018-11-08

## 2018-11-08 PROBLEM — D22.39 MELANOCYTIC NEVI OF OTHER PARTS OF FACE: Status: ACTIVE | Noted: 2018-11-08

## 2018-11-08 PROBLEM — D22.62 MELANOCYTIC NEVI OF LEFT UPPER LIMB, INCLUDING SHOULDER: Status: ACTIVE | Noted: 2018-11-08

## 2018-11-08 PROCEDURE — ? COUNSELING

## 2018-11-08 PROCEDURE — 99214 OFFICE O/P EST MOD 30 MIN: CPT

## 2018-11-08 PROCEDURE — ? SUNSCREEN RECOMMENDATIONS

## 2018-11-08 PROCEDURE — ? PRESCRIPTION

## 2018-11-08 RX ORDER — KETOCONAZOLE 20 MG/G
CREAM TOPICAL BID
Qty: 1 | Refills: 2 | Status: ERX | COMMUNITY
Start: 2018-11-08

## 2018-11-08 RX ADMIN — KETOCONAZOLE: 20 CREAM TOPICAL at 20:25

## 2018-11-08 ASSESSMENT — LOCATION ZONE DERM
LOCATION ZONE: FACE
LOCATION ZONE: NECK
LOCATION ZONE: TRUNK
LOCATION ZONE: ARM

## 2018-11-08 ASSESSMENT — LOCATION DETAILED DESCRIPTION DERM
LOCATION DETAILED: STERNAL NOTCH
LOCATION DETAILED: LEFT MID-UPPER BACK
LOCATION DETAILED: LEFT INFERIOR CENTRAL MALAR CHEEK
LOCATION DETAILED: RIGHT LATERAL SUPERIOR CHEST
LOCATION DETAILED: LEFT PROXIMAL DORSAL FOREARM
LOCATION DETAILED: LEFT SUPERIOR MEDIAL UPPER BACK
LOCATION DETAILED: LEFT MEDIAL BREAST 6-7:00 REGION
LOCATION DETAILED: RIGHT PROXIMAL DORSAL FOREARM
LOCATION DETAILED: LEFT MEDIAL UPPER BACK
LOCATION DETAILED: MID TRAPEZIAL NECK

## 2018-11-08 ASSESSMENT — LOCATION SIMPLE DESCRIPTION DERM
LOCATION SIMPLE: LEFT FOREARM
LOCATION SIMPLE: LEFT CHEEK
LOCATION SIMPLE: RIGHT FOREARM
LOCATION SIMPLE: CHEST
LOCATION SIMPLE: TRAPEZIAL NECK
LOCATION SIMPLE: LEFT UPPER BACK
LOCATION SIMPLE: LEFT BREAST

## 2018-11-09 LAB — INR BLD: 3.1 (ref 0.9–1.2)

## 2018-11-20 ENCOUNTER — ANTICOAGULATION VISIT (OUTPATIENT)
Dept: VASCULAR LAB | Facility: MEDICAL CENTER | Age: 78
End: 2018-11-20
Attending: INTERNAL MEDICINE
Payer: COMMERCIAL

## 2018-11-20 VITALS — HEART RATE: 69 BPM | SYSTOLIC BLOOD PRESSURE: 138 MMHG | DIASTOLIC BLOOD PRESSURE: 74 MMHG

## 2018-11-20 DIAGNOSIS — I48.19 PERSISTENT ATRIAL FIBRILLATION (HCC): ICD-10-CM

## 2018-11-20 LAB
INR BLD: 5.8 (ref 0.9–1.2)
INR PPP: 5.8 (ref 2–3.5)

## 2018-11-20 PROCEDURE — 99212 OFFICE O/P EST SF 10 MIN: CPT | Performed by: PHARMACIST

## 2018-11-20 PROCEDURE — 85610 PROTHROMBIN TIME: CPT

## 2018-11-20 NOTE — PROGRESS NOTES
Anticoagulation Summary  As of 11/20/2018    INR goal:   2.0-3.0   TTR:   68.7 % (2.8 y)   Today's INR:   5.8!   Warfarin maintenance plan:   2.5 mg (5 mg x 0.5) on Mon; 5 mg (5 mg x 1) all other days   Weekly warfarin total:   32.5 mg   Plan last modified:   LEXIS Hazel (10/22/2018)   Next INR check:   11/29/2018   Target end date:   Indefinite    Indications    Persistent atrial fibrillation (HCC) [I48.1]             Anticoagulation Episode Summary     INR check location:       Preferred lab:       Send INR reminders to:       Comments:         Anticoagulation Care Providers     Provider Role Specialty Phone number    Dayo Nelson M.D. Referring Cardiology [phone]    [facility] Responsible  [phone]        Anticoagulation Patient Findings      HPI:  Rebekah Hayward seen in clinic today, on anticoagulation therapy with warfarin for Afib  Changes to current medical/health status since last appt: pt states that she has a UTI and is on an antibiotic, she doesn't know the name of the antibiotic but believes she has two more days of therapy.  Denies signs/symptoms of bleeding and/or thrombosis since the last appt.    Denies any interval changes to diet  Denies any interval changes to medications since last appt.   Denies any complications or cost restrictions with current therapy.   BP recorded in vitals.      A/P   INR  is supra-therapeutic.   Most likely due to antibiotic interaction, will have pt hold warfarin for 3 doses and then resume with her normal dose.    Follow up appointment in 1 week(s).    Cecilia Mac, PharmD

## 2018-11-29 ENCOUNTER — ANTICOAGULATION VISIT (OUTPATIENT)
Dept: VASCULAR LAB | Facility: MEDICAL CENTER | Age: 78
End: 2018-11-29
Attending: INTERNAL MEDICINE
Payer: COMMERCIAL

## 2018-11-29 VITALS — HEART RATE: 80 BPM | DIASTOLIC BLOOD PRESSURE: 97 MMHG | SYSTOLIC BLOOD PRESSURE: 154 MMHG

## 2018-11-29 DIAGNOSIS — I48.19 PERSISTENT ATRIAL FIBRILLATION (HCC): ICD-10-CM

## 2018-11-29 LAB — INR PPP: 2.2 (ref 2–3.5)

## 2018-11-29 PROCEDURE — 85610 PROTHROMBIN TIME: CPT

## 2018-11-29 PROCEDURE — 99211 OFF/OP EST MAY X REQ PHY/QHP: CPT

## 2018-11-29 NOTE — PROGRESS NOTES
Anticoagulation Summary  As of 11/29/2018    INR goal:   2.0-3.0   TTR:   68.2 % (2.8 y)   Today's INR:   2.2   Warfarin maintenance plan:   2.5 mg (5 mg x 0.5) on Mon; 5 mg (5 mg x 1) all other days   Weekly warfarin total:   32.5 mg   Plan last modified:   LEXIS Hazel (10/22/2018)   Next INR check:   12/12/2018   Target end date:   Indefinite    Indications    Persistent atrial fibrillation (HCC) [I48.1]             Anticoagulation Episode Summary     INR check location:       Preferred lab:       Send INR reminders to:       Comments:         Anticoagulation Care Providers     Provider Role Specialty Phone number    Dayo Nelson M.D. Referring Cardiology [phone]    [facility] Responsible  [phone]        Anticoagulation Patient Findings      HPI:  Rebekah Hayward seen in clinic today, on anticoagulation therapy with warfarin for PAF.   Changes to current medical/health status since last appt: none  Denies signs/symptoms of bleeding and/or thrombosis since the last appt.    Denies any interval changes to diet  Denies any interval changes to medications since last appt.   Denies any complications or cost restrictions with current therapy.   BP recorded in vitals.  BP is high, she states she is working with cardiology to reduce her BP right now.  Confirmed dosing regimen.     A/P   INR  therapeutic.   Pt is no longer on ABX, likely DDI kept INR high last visit.     Follow up appointment in 2 week(s).    Matt Walters, PharmD

## 2018-11-30 LAB — INR BLD: 2.3 (ref 0.9–1.2)

## 2018-12-12 ENCOUNTER — ANTICOAGULATION VISIT (OUTPATIENT)
Dept: VASCULAR LAB | Facility: MEDICAL CENTER | Age: 78
End: 2018-12-12
Attending: INTERNAL MEDICINE
Payer: COMMERCIAL

## 2018-12-12 VITALS — DIASTOLIC BLOOD PRESSURE: 98 MMHG | HEART RATE: 64 BPM | SYSTOLIC BLOOD PRESSURE: 157 MMHG

## 2018-12-12 DIAGNOSIS — I48.19 PERSISTENT ATRIAL FIBRILLATION (HCC): ICD-10-CM

## 2018-12-12 LAB — INR PPP: 2.6 (ref 2–3.5)

## 2018-12-12 PROCEDURE — 85610 PROTHROMBIN TIME: CPT

## 2018-12-12 PROCEDURE — 99211 OFF/OP EST MAY X REQ PHY/QHP: CPT | Performed by: NURSE PRACTITIONER

## 2018-12-12 NOTE — PROGRESS NOTES
Anticoagulation Summary  As of 12/12/2018    INR goal:   2.0-3.0   TTR:   68.6 % (2.9 y)   Today's INR:   2.6   Warfarin maintenance plan:   2.5 mg (5 mg x 0.5) on Mon; 5 mg (5 mg x 1) all other days   Weekly warfarin total:   32.5 mg   Plan last modified:   BUZZ HazelPLAMINE (10/22/2018)   Next INR check:   1/9/2019   Target end date:   Indefinite    Indications    Persistent atrial fibrillation (HCC) [I48.1]             Anticoagulation Episode Summary     INR check location:       Preferred lab:       Send INR reminders to:       Comments:         Anticoagulation Care Providers     Provider Role Specialty Phone number    Dayo Nelson M.D. Referring Cardiology [phone]    [facility] Responsible  [phone]        Anticoagulation Patient Findings      HPI:  Rebekah Harry Hayward seen in clinic today for follow up on anticoagulation therapy in the presence of AF. Denies any changes to current medical/health status since last appointment. Denies any medication or diet changes. No current symptoms of bleeding or thrombosis reported.    A/P:   INR therapeutic. Continue current regimen. BP recorded in vitals.    Follow up appointment in 4 week(s).    Next Appointment: Thursday, January 10, 2019 at 7:30 am.     Tiffany ZAZUETA

## 2018-12-18 LAB — INR BLD: 2.6 (ref 0.9–1.2)

## 2019-01-10 ENCOUNTER — APPOINTMENT (OUTPATIENT)
Dept: VASCULAR LAB | Facility: MEDICAL CENTER | Age: 79
End: 2019-01-10
Payer: COMMERCIAL

## 2019-02-04 ENCOUNTER — ANTICOAGULATION MONITORING (OUTPATIENT)
Dept: VASCULAR LAB | Facility: MEDICAL CENTER | Age: 79
End: 2019-02-04

## 2019-02-04 DIAGNOSIS — I48.19 PERSISTENT ATRIAL FIBRILLATION (HCC): ICD-10-CM

## 2019-02-05 NOTE — PROGRESS NOTES
[facility]    Called patient to remind her to obtain INR as it is overdue.   Patient informed me that she received a home monitor and is being monitored by a physician at [place].  Patient will be discharged from our service.     Roshan Hough PharmD

## 2019-05-13 ENCOUNTER — HOSPITAL ENCOUNTER (OUTPATIENT)
Dept: HOSPITAL 8 - CFH | Age: 79
Discharge: HOME | End: 2019-05-13
Attending: NURSE PRACTITIONER
Payer: MEDICARE

## 2019-05-13 DIAGNOSIS — E78.5: ICD-10-CM

## 2019-05-13 DIAGNOSIS — I08.8: Primary | ICD-10-CM

## 2019-05-13 DIAGNOSIS — I10: ICD-10-CM

## 2019-05-13 DIAGNOSIS — I48.2: ICD-10-CM

## 2019-05-13 PROCEDURE — 93306 TTE W/DOPPLER COMPLETE: CPT

## 2019-08-20 RX ORDER — WARFARIN SODIUM 5 MG/1
5 TABLET ORAL DAILY
Qty: 100 TAB | Refills: 3 | OUTPATIENT
Start: 2019-08-20

## 2019-10-29 ENCOUNTER — HOSPITAL ENCOUNTER (OUTPATIENT)
Dept: HOSPITAL 8 - CFH | Age: 79
Discharge: HOME | End: 2019-10-29
Attending: INTERNAL MEDICINE
Payer: MEDICARE

## 2019-10-29 DIAGNOSIS — Z95.0: ICD-10-CM

## 2019-10-29 DIAGNOSIS — I08.3: Primary | ICD-10-CM

## 2019-10-29 DIAGNOSIS — I25.2: ICD-10-CM

## 2019-10-29 DIAGNOSIS — G72.9: ICD-10-CM

## 2019-10-29 DIAGNOSIS — R53.83: ICD-10-CM

## 2019-10-29 DIAGNOSIS — I10: ICD-10-CM

## 2019-10-29 DIAGNOSIS — E11.9: ICD-10-CM

## 2019-10-29 PROCEDURE — 93306 TTE W/DOPPLER COMPLETE: CPT

## 2020-01-09 ENCOUNTER — HOSPITAL ENCOUNTER (OUTPATIENT)
Dept: HOSPITAL 8 - CFH | Age: 80
Discharge: HOME | End: 2020-01-09
Attending: NURSE PRACTITIONER
Payer: MEDICARE

## 2020-01-09 DIAGNOSIS — N95.9: ICD-10-CM

## 2020-01-09 DIAGNOSIS — Z13.820: Primary | ICD-10-CM

## 2020-01-09 DIAGNOSIS — M85.88: ICD-10-CM

## 2020-01-09 PROCEDURE — 77080 DXA BONE DENSITY AXIAL: CPT

## 2020-03-19 NOTE — HPI: FULL BODY SKIN EXAMINATION
----- Message from Alfredo Gomez RN sent at 3/19/2020 10:28 AM CDT -----  F/E xrays look good.  OK to d/c brace.  ----- Message -----  From: Spring Castillo Incoming Radiology From [facility]  Sent: 3/17/2020  12:32 PM CDT  To: Spike Luke MD      
Called pt, informed her of results and POC. She verbalized understanding.  
How Severe Are Your Spot(S)?: moderate
What Is The Reason For Today's Visit?: Full Body Skin Examination
What Is The Reason For Today's Visit? (Being Monitored For X): concerning skin lesions on an annual basis

## 2020-05-04 ENCOUNTER — HOSPITAL ENCOUNTER (OUTPATIENT)
Dept: HOSPITAL 8 - CFH | Age: 80
Discharge: HOME | End: 2020-05-04
Attending: INTERNAL MEDICINE
Payer: MEDICARE

## 2020-05-04 DIAGNOSIS — E78.5: ICD-10-CM

## 2020-05-04 DIAGNOSIS — I10: ICD-10-CM

## 2020-05-04 DIAGNOSIS — I31.3: ICD-10-CM

## 2020-05-04 DIAGNOSIS — I08.3: Primary | ICD-10-CM

## 2020-05-04 PROCEDURE — 93306 TTE W/DOPPLER COMPLETE: CPT

## 2021-01-14 DIAGNOSIS — Z23 NEED FOR VACCINATION: ICD-10-CM

## 2021-04-26 ENCOUNTER — HOSPITAL ENCOUNTER (OUTPATIENT)
Dept: HOSPITAL 8 - CFH | Age: 81
Discharge: HOME | End: 2021-04-26
Attending: INTERNAL MEDICINE
Payer: MEDICARE

## 2021-04-26 DIAGNOSIS — I11.9: ICD-10-CM

## 2021-04-26 DIAGNOSIS — I48.19: ICD-10-CM

## 2021-04-26 DIAGNOSIS — I77.810: ICD-10-CM

## 2021-04-26 DIAGNOSIS — I08.3: Primary | ICD-10-CM

## 2021-04-26 DIAGNOSIS — I21.09: ICD-10-CM

## 2021-04-26 PROCEDURE — 93017 CV STRESS TEST TRACING ONLY: CPT

## 2021-04-26 PROCEDURE — A9502 TC99M TETROFOSMIN: HCPCS

## 2021-04-26 PROCEDURE — 78452 HT MUSCLE IMAGE SPECT MULT: CPT

## 2021-04-26 PROCEDURE — 93306 TTE W/DOPPLER COMPLETE: CPT

## 2023-07-19 NOTE — TELEPHONE ENCOUNTER
Problem: MOBILITY - ADULT  Goal: Maintain or return to baseline ADL function  Description: INTERVENTIONS:  -  Assess patient's ability to carry out ADLs; assess patient's baseline for ADL function and identify physical deficits which impact ability to perform ADLs (bathing, care of mouth/teeth, toileting, grooming, dressing, etc.)  - Assess/evaluate cause of self-care deficits   - Assess range of motion  - Assess patient's mobility; develop plan if impaired  - Assess patient's need for assistive devices and provide as appropriate  - Encourage maximum independence but intervene and supervise when necessary  - Involve family in performance of ADLs  - Assess for home care needs following discharge   - Consider OT consult to assist with ADL evaluation and planning for discharge  - Provide patient education as appropriate  Outcome: Progressing  Goal: Maintains/Returns to pre admission functional level  Description: INTERVENTIONS:  - Perform BMAT or MOVE assessment daily.   - Set and communicate daily mobility goal to care team and patient/family/caregiver. - Collaborate with rehabilitation services on mobility goals if consulted  - Perform Range of Motion 4 times a day. - Reposition patient every 4 hours.   - Dangle patient 4 times a day  - Stand patient 4 times a day  - Ambulate patient 4 times a day  - Out of bed to chair 4 times a day   - Out of bed for meals 4 times a day  - Out of bed for toileting  - Record patient progress and toleration of activity level   Outcome: Progressing S/w pt, requesting referral to eye m.d., Dr. Lott, [facility] to f/u regarding her cataracts. Advised pt to get referral from PCP, as most insurance are requiring this.  Pt states her PCP office is closed and states it will not be open again until September and the voicemail does not allow you to leave a message.  Advised pt will contact [facility] and let her know what I find out, but in meantime will ask CW if ok to refer to eye m.d.    To CW, ok to refer to Dr. Lott, dmitriy gray, [facility].?    [address] - S/w Luis Alberto, [facility], states Dr. Whittington went to [facility], therefore pt needs to establish with new PCP.  Pt notified of this.

## 2023-10-30 PROBLEM — M17.9 OSTEOARTHRITIS, KNEE: Status: ACTIVE | Noted: 2023-10-30

## 2023-11-10 PROBLEM — M17.12 PRIMARY OSTEOARTHRITIS OF LEFT KNEE: Status: ACTIVE | Noted: 2023-10-30
